# Patient Record
Sex: FEMALE | Race: WHITE | NOT HISPANIC OR LATINO | Employment: OTHER | URBAN - METROPOLITAN AREA
[De-identification: names, ages, dates, MRNs, and addresses within clinical notes are randomized per-mention and may not be internally consistent; named-entity substitution may affect disease eponyms.]

---

## 2019-11-22 ENCOUNTER — OFFICE VISIT (OUTPATIENT)
Dept: GASTROENTEROLOGY | Facility: CLINIC | Age: 69
End: 2019-11-22
Payer: MEDICARE

## 2019-11-22 VITALS
WEIGHT: 231.2 LBS | BODY MASS INDEX: 42.55 KG/M2 | HEART RATE: 62 BPM | HEIGHT: 62 IN | DIASTOLIC BLOOD PRESSURE: 72 MMHG | SYSTOLIC BLOOD PRESSURE: 140 MMHG

## 2019-11-22 DIAGNOSIS — R14.0 ABDOMINAL BLOATING: Primary | ICD-10-CM

## 2019-11-22 DIAGNOSIS — K21.9 GASTROESOPHAGEAL REFLUX DISEASE WITHOUT ESOPHAGITIS: ICD-10-CM

## 2019-11-22 DIAGNOSIS — K59.00 CONSTIPATION, UNSPECIFIED CONSTIPATION TYPE: ICD-10-CM

## 2019-11-22 PROCEDURE — 99214 OFFICE O/P EST MOD 30 MIN: CPT | Performed by: PHYSICIAN ASSISTANT

## 2019-11-22 RX ORDER — DIPHENOXYLATE HYDROCHLORIDE AND ATROPINE SULFATE 2.5; .025 MG/1; MG/1
1 TABLET ORAL DAILY
COMMUNITY

## 2019-11-22 RX ORDER — CYANOCOBALAMIN 1000 UG/ML
INJECTION INTRAMUSCULAR; SUBCUTANEOUS
Refills: 5 | COMMUNITY
Start: 2019-11-05

## 2019-11-22 RX ORDER — NICOTINE POLACRILEX 2 MG
GUM BUCCAL
COMMUNITY
End: 2022-05-04

## 2019-11-22 RX ORDER — PANTOPRAZOLE SODIUM 40 MG/1
TABLET, DELAYED RELEASE ORAL
COMMUNITY
Start: 2018-05-01 | End: 2019-12-04 | Stop reason: SDUPTHER

## 2019-11-22 RX ORDER — NICOTINE POLACRILEX 2 MG
5000 GUM BUCCAL DAILY
COMMUNITY

## 2019-11-22 RX ORDER — FLUTICASONE PROPIONATE 50 MCG
SPRAY, SUSPENSION (ML) NASAL
Refills: 3 | COMMUNITY
Start: 2019-11-08

## 2019-11-22 RX ORDER — ASCORBIC ACID 250 MG
1000 TABLET ORAL 2 TIMES DAILY
COMMUNITY

## 2019-11-22 RX ORDER — FUROSEMIDE 20 MG/1
20 TABLET ORAL DAILY
Refills: 1 | COMMUNITY
Start: 2019-08-20

## 2019-11-22 RX ORDER — POTASSIUM CHLORIDE 750 MG/1
10 TABLET, EXTENDED RELEASE ORAL 2 TIMES DAILY
Refills: 0 | COMMUNITY
Start: 2019-11-08

## 2019-11-22 NOTE — PROGRESS NOTES
[facility] - Outpatient Follow-up Note  Marika Cardenas 81 y o  female MRN: [id number]  Encounter: [id number]          ASSESSMENT AND PLAN:      1  Abdominal bloating  2  Gastroesophageal reflux disease without esophagitis  3  Constipation, unspecified constipation type  Will increase PPI to BID dosing  Will give Rifaxamin trial    Will continue fiber and probiotics  Follow up in 3 weeks  ______________________________________________________________________    SUBJECTIVE:    45-year-old female who is here with abdominal bloating, constipation, gas, and nausea  Patient reports that she has had ongoing issues related to her stomach for some time now but worsened this past summer  Patient reports chronic constipation where she has to take milk of magnesia every 2nd or 3rd day to have a bowel movement  Patient denies any melena or rectal bleeding  Patient's last colonoscopy was in 2014 this was a normal examination  Patient does have a history of a gastric bypass  Patient did have an upper endoscopy in 2018 this was a normal examination  Patient reports that the bloating that she has become so severe after eating that she does not want to eat  She does take pantoprazole every day daily  She denies any hematemesis or dysphagia  REVIEW OF SYSTEMS IS OTHERWISE NEGATIVE        Historical Information   Past Medical History:   Diagnosis Date    [facility] )     Diverticulosis     Endometrial cancer ([facility] )      Past Surgical History:   Procedure Laterality Date    COLONOSCOPY       Social History   Social History     Substance and Sexual Activity   Alcohol Use Not Currently     Social History     Substance and Sexual Activity   Drug Use Never     Social History     Tobacco Use   Smoking Status Never Smoker   Smokeless Tobacco Never Used     Family History   Problem Relation Age of Onset    Cancer Mother     Heart disease Mother     Breast cancer additional onset Mother     Kidney disease Father     Hypertension Father        Meds/Allergies       Current Outpatient Medications:     ascorbic acid (VITAMIN C) 250 MG tablet    aspirin 81 MG tablet    Biotin 1 MG CAPS    Biotin 1 MG CAPS    Calcium Carbonate-Vitamin D3 600-400 MG-UNIT TABS    Cholecalciferol 50 MCG (2000 UT) CAPS    cyanocobalamin 1,000 mcg/mL    fluticasone (FLONASE) 50 mcg/act nasal spray    furosemide (LASIX) 20 mg tablet    KLOR-CON M10 10 MEQ tablet    multivitamin (THERAGRAN) TABS    pantoprazole (PROTONIX) 40 mg tablet    Probiotic Product (PROBIOTIC-10 PO)    rifaximin (XIFAXAN) 550 mg tablet    No Known Allergies        Objective     Blood pressure 140/72, pulse 62, height 5' 2" (1 575 m), weight 105 kg (231 lb 3 2 oz)  Body mass index is 42 29 kg/m²  PHYSICAL EXAM:      General Appearance:   Alert, cooperative, no distress   HEENT:   Normocephalic, atraumatic, anicteric      Neck:  Supple, symmetrical, trachea midline   Lungs:   Clear to auscultation bilaterally; no rales, rhonchi or wheezing; respirations unlabored    Heart[de-identified]   Regular rate and rhythm; no murmur, rub, or gallop  Abdomen:   Soft, non-tender, non-distended; normal bowel sounds; no masses, no organomegaly    Genitalia:   Deferred    Rectal:   Deferred    Extremities:  No cyanosis, clubbing or edema    Pulses:  2+ and symmetric    Skin:  No jaundice, rashes, or lesions    Lymph nodes:  No palpable cervical lymphadenopathy        Lab Results:   No visits with results within 1 Day(s) from this visit  Latest known visit with results is:   No results found for any previous visit  Radiology Results:   No results found

## 2019-11-22 NOTE — LETTER
November 22, 2019     Johnny Lancaster PA-C  [address] [facility]  [address] [facility]  [address] [address]    Patient: Heri Ashton   YOB: 1950   Date of Visit: 11/22/2019       Dear Dr Negra Omalley: Thank you for referring Heri Ashton to me for evaluation  Below are my notes for this consultation  If you have questions, please do not hesitate to call me  I look forward to following your patient along with you  Sincerely,        Emory Simmons PA-C        CC: No Recipients  Sam Gracia  11/22/2019 12:19 PM  Sign at close encounter  [facility] - Outpatient Follow-up Note  Heri Ashton 81 y o  female MRN: [id number]  Encounter: [id number]          ASSESSMENT AND PLAN:      1  Abdominal bloating  2  Gastroesophageal reflux disease without esophagitis  3  Constipation, unspecified constipation type  Will increase PPI to BID dosing  Will give Rifaxamin trial    Will continue fiber and probiotics  Follow up in 3 weeks  ______________________________________________________________________    SUBJECTIVE:    29-year-old female who is here with abdominal bloating, constipation, gas, and nausea  Patient reports that she has had ongoing issues related to her stomach for some time now but worsened this past summer  Patient reports chronic constipation where she has to take milk of magnesia every 2nd or 3rd day to have a bowel movement  Patient denies any melena or rectal bleeding  Patient's last colonoscopy was in 2014 this was a normal examination  Patient does have a history of a gastric bypass  Patient did have an upper endoscopy in 2018 this was a normal examination  Patient reports that the bloating that she has become so severe after eating that she does not want to eat  She does take pantoprazole every day daily  She denies any hematemesis or dysphagia  REVIEW OF SYSTEMS IS OTHERWISE NEGATIVE        Historical Information   Past Medical History:   Diagnosis Date    Cancer [facility])     Diverticulosis     [facility] )      Past Surgical History:   Procedure Laterality Date    COLONOSCOPY       Social History   Social History     Substance and Sexual Activity   Alcohol Use Not Currently     Social History     Substance and Sexual Activity   Drug Use Never     Social History     Tobacco Use   Smoking Status Never Smoker   Smokeless Tobacco Never Used     Family History   Problem Relation Age of Onset    Cancer Mother     Heart disease Mother     Breast cancer additional onset Mother     Kidney disease Father     Hypertension Father        Meds/Allergies       Current Outpatient Medications:     ascorbic acid (VITAMIN C) 250 MG tablet    aspirin 81 MG tablet    Biotin 1 MG CAPS    Biotin 1 MG CAPS    Calcium Carbonate-Vitamin D3 600-400 MG-UNIT TABS    Cholecalciferol 50 MCG (2000 UT) CAPS    cyanocobalamin 1,000 mcg/mL    fluticasone (FLONASE) 50 mcg/act nasal spray    furosemide (LASIX) 20 mg tablet    KLOR-CON M10 10 MEQ tablet    multivitamin (THERAGRAN) TABS    pantoprazole (PROTONIX) 40 mg tablet    Probiotic Product (PROBIOTIC-10 PO)    rifaximin (XIFAXAN) 550 mg tablet    No Known Allergies        Objective     Blood pressure 140/72, pulse 62, height 5' 2" (1 575 m), weight 105 kg (231 lb 3 2 oz)  Body mass index is 42 29 kg/m²  PHYSICAL EXAM:      General Appearance:   Alert, cooperative, no distress   HEENT:   Normocephalic, atraumatic, anicteric      Neck:  Supple, symmetrical, trachea midline   Lungs:   Clear to auscultation bilaterally; no rales, rhonchi or wheezing; respirations unlabored    Heart[de-identified]   Regular rate and rhythm; no murmur, rub, or gallop     Abdomen:   Soft, non-tender, non-distended; normal bowel sounds; no masses, no organomegaly    Genitalia:   Deferred    Rectal:   Deferred    Extremities:  No cyanosis, clubbing or edema    Pulses:  2+ and symmetric    Skin:  No jaundice, rashes, or lesions    Lymph nodes:  No palpable cervical lymphadenopathy        Lab Results:   No visits with results within 1 Day(s) from this visit  Latest known visit with results is:   No results found for any previous visit  Radiology Results:   No results found

## 2019-11-22 NOTE — PATIENT INSTRUCTIONS
Gas and Bloating   WHAT YOU NEED TO KNOW:   Gas forms inside your body when you eat certain foods or swallow too much air  Bloating is the tight, full feeling you get from too much gas  DISCHARGE INSTRUCTIONS:   Medicines:   · Gas relief medicines: These may help decrease gas pain and bloating  These can be bought without a doctor's order  · Take your medicine as directed  Contact your healthcare provider if you think your medicine is not helping or if you have side effects  Tell him or her if you are allergic to any medicine  Keep a list of the medicines, vitamins, and herbs you take  Include the amounts, and when and why you take them  Bring the list or the pill bottles to follow-up visits  Carry your medicine list with you in case of an emergency  How to manage gas and bloating:   · Keep a log:  Write down what you eat and drink and how often you pass gas each day  · Eat and drink slowly:  Choose foods that do not cause gas, such as meat, poultry, fish, and eggs  Avoid high-fat foods and vegetables or starches that can cause gas  Do not drink carbonated drinks  Add foods back into your diet one at a time after about a week  If the food causes symptoms, avoid it  · Exercise:  Exercise can help relieve gas  · Do not smoke or chew gum: This can cause you to swallow air  · Make sure your dentures fit properly:  Have your dentures fixed if they are loose  Loose dentures can cause you to swallow too much air  Follow up with your healthcare provider as directed:  Write down your questions so you remember to ask them during your visits  Contact your healthcare provider if:   · You have a fever  · You vomit or have diarrhea  · You lose weight without trying  · You have questions or concerns about your condition or care  Return to the emergency department if:   · You have severe abdominal pain  · You have blood in your bowel movement    © 2017 [address] [employer] Information is for End User's use only and may not be sold, redistributed or otherwise used for commercial purposes  All illustrations and images included in CareNotes® are the copyrighted property of A D A M [employer] Inc  or Jason Sanchez  The above information is an  only  It is not intended as medical advice for individual conditions or treatments  Talk to your doctor, nurse or pharmacist before following any medical regimen to see if it is safe and effective for you

## 2019-11-26 ENCOUNTER — TELEPHONE (OUTPATIENT)
Dept: GASTROENTEROLOGY | Facility: CLINIC | Age: 69
End: 2019-11-26

## 2019-11-26 NOTE — TELEPHONE ENCOUNTER
Roge Winkler pt - Pt states that she has not received a call from the pharmacy that Roge Winkler said would call her, also wants to know if she can get the medication locally  Please call [phone]   Ty

## 2019-11-26 NOTE — TELEPHONE ENCOUNTER
Called [facility] and spoke to (Rep) she confirmed the copay is $622 24  Aaron Rodriges going to contact pt and advised her of the copay and offer her patient assistance

## 2019-12-03 ENCOUNTER — TELEPHONE (OUTPATIENT)
Dept: GASTROENTEROLOGY | Facility: CLINIC | Age: 69
End: 2019-12-03

## 2019-12-03 NOTE — TELEPHONE ENCOUNTER
Jason Andrade pt - Pt had some questions regarding the medication the Khloe prescribed for her  Pt states she received a letter from the pharmacy that she has a question about  Please call [phone]   Ty

## 2019-12-04 DIAGNOSIS — K21.9 GASTROESOPHAGEAL REFLUX DISEASE WITHOUT ESOPHAGITIS: Primary | ICD-10-CM

## 2019-12-04 RX ORDER — PANTOPRAZOLE SODIUM 40 MG/1
40 TABLET, DELAYED RELEASE ORAL 2 TIMES DAILY
Qty: 180 TABLET | Refills: 3 | Status: SHIPPED | OUTPATIENT
Start: 2019-12-04 | End: 2020-12-21

## 2019-12-13 DIAGNOSIS — K58.0 IRRITABLE BOWEL SYNDROME WITH DIARRHEA: Primary | ICD-10-CM

## 2019-12-13 NOTE — TELEPHONE ENCOUNTER
Rx for Xifaxan sent for approval    Rx sent to print to send along with Pt  Assistance Program application

## 2019-12-16 ENCOUNTER — OFFICE VISIT (OUTPATIENT)
Dept: GASTROENTEROLOGY | Facility: CLINIC | Age: 69
End: 2019-12-16
Payer: MEDICARE

## 2019-12-16 VITALS
SYSTOLIC BLOOD PRESSURE: 142 MMHG | DIASTOLIC BLOOD PRESSURE: 84 MMHG | HEART RATE: 67 BPM | BODY MASS INDEX: 42.76 KG/M2 | HEIGHT: 62 IN | WEIGHT: 232.4 LBS

## 2019-12-16 DIAGNOSIS — R14.0 ABDOMINAL BLOATING: Primary | ICD-10-CM

## 2019-12-16 DIAGNOSIS — K58.0 IRRITABLE BOWEL SYNDROME WITH DIARRHEA: ICD-10-CM

## 2019-12-16 PROCEDURE — 99213 OFFICE O/P EST LOW 20 MIN: CPT | Performed by: PHYSICIAN ASSISTANT

## 2019-12-16 NOTE — PROGRESS NOTES
[facility] - Outpatient Follow-up Note  Tata Frost 81 y o  female MRN: [id number]  Encounter: [id number]          ASSESSMENT AND PLAN:      1  Irritable bowel syndrome with diarrhea  2  Abdominal bloating  Will continue fiber and probiotics  Will give Xifaxan trial   Samples provided  Will continue PPI b i d  Recommend 6 small meals a day  No plans for any endoscopic evaluation  ______________________________________________________________________    SUBJECTIVE:    29-year-old female who is here for follow-up of irritable bowel syndrome as well as abdominal bloating  Patient has not yet received her Xifaxan course due to insurance issues  She is reporting improvement since restarting her pantoprazole 40 mg twice a day but she is still having issues with abdominal gas and bloating  She denies any constipation or diarrhea issues  She denies any severe abdominal pains  She does report abdominal discomfort  REVIEW OF SYSTEMS IS OTHERWISE NEGATIVE        Historical Information   Past Medical History:   Diagnosis Date    Cancer ([facility] )     Diverticulosis     Endometrial cancer ([facility] )      Past Surgical History:   Procedure Laterality Date    COLONOSCOPY       Social History   Social History     Substance and Sexual Activity   Alcohol Use Not Currently     Social History     Substance and Sexual Activity   Drug Use Never     Social History     Tobacco Use   Smoking Status Never Smoker   Smokeless Tobacco Never Used     Family History   Problem Relation Age of Onset    Cancer Mother     Heart disease Mother     Breast cancer additional onset Mother     Kidney disease Father     Hypertension Father        Meds/Allergies       Current Outpatient Medications:     ascorbic acid (VITAMIN C) 250 MG tablet    aspirin 81 MG tablet    Biotin 1 MG CAPS    Biotin 1 MG CAPS    Calcium Carbonate-Vitamin D3 600-400 MG-UNIT TABS    Cholecalciferol 50 MCG (2000 UT) CAPS   cyanocobalamin 1,000 mcg/mL    fluticasone (FLONASE) 50 mcg/act nasal spray    furosemide (LASIX) 20 mg tablet    KLOR-CON M10 10 MEQ tablet    multivitamin (THERAGRAN) TABS    pantoprazole (PROTONIX) 40 mg tablet    Probiotic Product (PROBIOTIC-10 PO)    rifaximin (XIFAXAN) 550 mg tablet    No Known Allergies        Objective     Blood pressure 142/84, pulse 67, height 5' 2" (1 575 m), weight 105 kg (232 lb 6 4 oz)  Body mass index is 42 51 kg/m²  PHYSICAL EXAM:      General Appearance:   Alert, cooperative, no distress   HEENT:   Normocephalic, atraumatic, anicteric      Neck:  Supple, symmetrical, trachea midline   Lungs:   Clear to auscultation bilaterally; no rales, rhonchi or wheezing; respirations unlabored    Heart[de-identified]   Regular rate and rhythm; no murmur, rub, or gallop  Abdomen:   Soft, non-tender, non-distended; normal bowel sounds; no masses, no organomegaly    Genitalia:   Deferred    Rectal:   Deferred    Extremities:  No cyanosis, clubbing or edema    Pulses:  2+ and symmetric    Skin:  No jaundice, rashes, or lesions    Lymph nodes:  No palpable cervical lymphadenopathy        Lab Results:   No visits with results within 1 Day(s) from this visit  Latest known visit with results is:   No results found for any previous visit  Radiology Results:   No results found

## 2019-12-16 NOTE — TELEPHONE ENCOUNTER
[facility] Patient Assistance for status update, they confirmed receipt of the application but it is still under review and will not have a determination until 2/2020  Khloe provided pt with samples of Xifaxan  No further action is needed at this time with [facility] Patient assistance

## 2020-01-07 ENCOUNTER — TELEPHONE (OUTPATIENT)
Dept: GASTROENTEROLOGY | Facility: CLINIC | Age: 70
End: 2020-01-07

## 2020-01-07 NOTE — TELEPHONE ENCOUNTER
rcvd letter from [facility] patient assistance     Stated pt is not eligible for assistance

## 2020-02-19 ENCOUNTER — TELEPHONE (OUTPATIENT)
Dept: GASTROENTEROLOGY | Facility: CLINIC | Age: 70
End: 2020-02-19

## 2020-02-19 NOTE — TELEPHONE ENCOUNTER
Destiney Wylie - patient called lmom she found a packet of antibiotics with 3  Pills  Should patient take them   Please call Felicita Willson at [phone]

## 2020-02-19 NOTE — TELEPHONE ENCOUNTER
Called pt, she found the xifaxan and asked if she should still take     I asked Michael Bennett and was told ok for pt to take    Pt also said has heart burn and made an appt to see Michael Bennett on the 27th

## 2020-02-21 ENCOUNTER — TELEPHONE (OUTPATIENT)
Dept: GASTROENTEROLOGY | Facility: CLINIC | Age: 70
End: 2020-02-21

## 2020-02-21 NOTE — TELEPHONE ENCOUNTER
Vaibhav Rodriguez - Patient called lmom - patient was very sick  Wednesday night, 02/19/20   Would like to discuss how she felt with the nurse, Please call Kerry Pérez at [phone] ty

## 2020-02-21 NOTE — TELEPHONE ENCOUNTER
Triage Telephone Intake  Provider: Treasure Humphreys  Chief complaint:loose stools 2/19 & into early am 2/20/20 with cramps in upper mid ABD  Diarrhea:2/19/20 & early am 2/20/20  Blood in stool:denies  LBM:today   Nausea:yes x3 days  Vomiting:denies  Fever:denies  Last office visit:12/16/19  Patient has follow up appointment scheduled: 2/27/20  Patient reported 2/19-2/20/20 early am: frequent loose stools (every hour) with x1 stool incontinence; GERD; weakness in both lower extremities & tiredness  Yesterday pm & today: patient reports feeling better  Denies frequent loose stools, cramping, GERD, weakness in both lower extremities  Reports at times nausea episodes  Recommendations given:(1) stay well hydrated; (2) call office any symptoms return; (3) go to ED if increased ABD pain, weakness in lower extremities, frequent loose stools, keep appointment with Treasure Humphreys on 2/27/20

## 2020-02-22 ENCOUNTER — HOSPITAL ENCOUNTER (EMERGENCY)
Facility: HOSPITAL | Age: 70
Discharge: HOME | End: 2020-02-22
Attending: EMERGENCY MEDICINE | Admitting: EMERGENCY MEDICINE
Payer: MEDICARE

## 2020-02-22 ENCOUNTER — APPOINTMENT (EMERGENCY)
Dept: CT IMAGING | Facility: HOSPITAL | Age: 70
End: 2020-02-22
Payer: MEDICARE

## 2020-02-22 VITALS
HEART RATE: 81 BPM | TEMPERATURE: 97.8 F | SYSTOLIC BLOOD PRESSURE: 126 MMHG | RESPIRATION RATE: 17 BRPM | OXYGEN SATURATION: 98 % | DIASTOLIC BLOOD PRESSURE: 74 MMHG

## 2020-02-22 DIAGNOSIS — N39.0 UTI (URINARY TRACT INFECTION): ICD-10-CM

## 2020-02-22 DIAGNOSIS — R19.7 DIARRHEA: Primary | ICD-10-CM

## 2020-02-22 LAB
ALBUMIN SERPL BCP-MCNC: 3.3 G/DL (ref 3.5–5)
ALP SERPL-CCNC: 87 U/L (ref 46–116)
ALT SERPL W P-5'-P-CCNC: 25 U/L (ref 12–78)
ANION GAP SERPL CALCULATED.3IONS-SCNC: 11 MMOL/L (ref 4–13)
AST SERPL W P-5'-P-CCNC: 21 U/L (ref 5–45)
BACTERIA UR QL AUTO: ABNORMAL /HPF
BASOPHILS # BLD AUTO: 0.05 THOUSANDS/ΜL (ref 0–0.1)
BASOPHILS NFR BLD AUTO: 1 % (ref 0–1)
BILIRUB SERPL-MCNC: 0.8 MG/DL (ref 0.2–1)
BILIRUB UR QL STRIP: NEGATIVE
BUN SERPL-MCNC: 20 MG/DL (ref 5–25)
CALCIUM SERPL-MCNC: 8.8 MG/DL (ref 8.3–10.1)
CHLORIDE SERPL-SCNC: 108 MMOL/L (ref 100–108)
CLARITY UR: CLEAR
CO2 SERPL-SCNC: 23 MMOL/L (ref 21–32)
COLOR UR: YELLOW
CREAT SERPL-MCNC: 0.76 MG/DL (ref 0.6–1.3)
EOSINOPHIL # BLD AUTO: 0.36 THOUSAND/ΜL (ref 0–0.61)
EOSINOPHIL NFR BLD AUTO: 8 % (ref 0–6)
ERYTHROCYTE [DISTWIDTH] IN BLOOD BY AUTOMATED COUNT: 14.6 % (ref 11.6–15.1)
GFR SERPL CREATININE-BSD FRML MDRD: 81 ML/MIN/1.73SQ M
GLUCOSE SERPL-MCNC: 90 MG/DL (ref 65–140)
GLUCOSE UR STRIP-MCNC: NEGATIVE MG/DL
HCT VFR BLD AUTO: 41.8 % (ref 34.8–46.1)
HGB BLD-MCNC: 13.3 G/DL (ref 11.5–15.4)
HGB UR QL STRIP.AUTO: NEGATIVE
HYALINE CASTS #/AREA URNS LPF: ABNORMAL /LPF
IMM GRANULOCYTES # BLD AUTO: 0.01 THOUSAND/UL (ref 0–0.2)
IMM GRANULOCYTES NFR BLD AUTO: 0 % (ref 0–2)
KETONES UR STRIP-MCNC: NEGATIVE MG/DL
LEUKOCYTE ESTERASE UR QL STRIP: ABNORMAL
LIPASE SERPL-CCNC: 114 U/L (ref 73–393)
LYMPHOCYTES # BLD AUTO: 1.12 THOUSANDS/ΜL (ref 0.6–4.47)
LYMPHOCYTES NFR BLD AUTO: 24 % (ref 14–44)
MCH RBC QN AUTO: 30.2 PG (ref 26.8–34.3)
MCHC RBC AUTO-ENTMCNC: 31.8 G/DL (ref 31.4–37.4)
MCV RBC AUTO: 95 FL (ref 82–98)
MONOCYTES # BLD AUTO: 0.65 THOUSAND/ΜL (ref 0.17–1.22)
MONOCYTES NFR BLD AUTO: 14 % (ref 4–12)
MUCOUS THREADS UR QL AUTO: ABNORMAL
NEUTROPHILS # BLD AUTO: 2.58 THOUSANDS/ΜL (ref 1.85–7.62)
NEUTS SEG NFR BLD AUTO: 53 % (ref 43–75)
NITRITE UR QL STRIP: POSITIVE
NON-SQ EPI CELLS URNS QL MICRO: ABNORMAL /HPF
NRBC BLD AUTO-RTO: 0 /100 WBCS
PH UR STRIP.AUTO: 5.5 [PH]
PLATELET # BLD AUTO: 151 THOUSANDS/UL (ref 149–390)
PMV BLD AUTO: 10.9 FL (ref 8.9–12.7)
POTASSIUM SERPL-SCNC: 3.7 MMOL/L (ref 3.5–5.3)
PROT SERPL-MCNC: 7.3 G/DL (ref 6.4–8.2)
PROT UR STRIP-MCNC: NEGATIVE MG/DL
RBC # BLD AUTO: 4.4 MILLION/UL (ref 3.81–5.12)
RBC #/AREA URNS AUTO: ABNORMAL /HPF
SODIUM SERPL-SCNC: 142 MMOL/L (ref 136–145)
SP GR UR STRIP.AUTO: <=1.005 (ref 1–1.03)
UROBILINOGEN UR QL STRIP.AUTO: 0.2 E.U./DL
WBC # BLD AUTO: 4.77 THOUSAND/UL (ref 4.31–10.16)
WBC #/AREA URNS AUTO: ABNORMAL /HPF

## 2020-02-22 PROCEDURE — 96361 HYDRATE IV INFUSION ADD-ON: CPT

## 2020-02-22 PROCEDURE — 87077 CULTURE AEROBIC IDENTIFY: CPT | Performed by: EMERGENCY MEDICINE

## 2020-02-22 PROCEDURE — 85025 COMPLETE CBC W/AUTO DIFF WBC: CPT | Performed by: EMERGENCY MEDICINE

## 2020-02-22 PROCEDURE — 87086 URINE CULTURE/COLONY COUNT: CPT | Performed by: EMERGENCY MEDICINE

## 2020-02-22 PROCEDURE — 99285 EMERGENCY DEPT VISIT HI MDM: CPT

## 2020-02-22 PROCEDURE — 36415 COLL VENOUS BLD VENIPUNCTURE: CPT | Performed by: EMERGENCY MEDICINE

## 2020-02-22 PROCEDURE — 99284 EMERGENCY DEPT VISIT MOD MDM: CPT | Performed by: EMERGENCY MEDICINE

## 2020-02-22 PROCEDURE — 81001 URINALYSIS AUTO W/SCOPE: CPT | Performed by: EMERGENCY MEDICINE

## 2020-02-22 PROCEDURE — 96374 THER/PROPH/DIAG INJ IV PUSH: CPT

## 2020-02-22 PROCEDURE — 74177 CT ABD & PELVIS W/CONTRAST: CPT

## 2020-02-22 PROCEDURE — 87186 SC STD MICRODIL/AGAR DIL: CPT | Performed by: EMERGENCY MEDICINE

## 2020-02-22 PROCEDURE — 83690 ASSAY OF LIPASE: CPT | Performed by: EMERGENCY MEDICINE

## 2020-02-22 PROCEDURE — 80053 COMPREHEN METABOLIC PANEL: CPT | Performed by: EMERGENCY MEDICINE

## 2020-02-22 RX ORDER — DICYCLOMINE HCL 20 MG
20 TABLET ORAL ONCE
Status: COMPLETED | OUTPATIENT
Start: 2020-02-22 | End: 2020-02-22

## 2020-02-22 RX ORDER — ONDANSETRON 2 MG/ML
4 INJECTION INTRAMUSCULAR; INTRAVENOUS ONCE
Status: COMPLETED | OUTPATIENT
Start: 2020-02-22 | End: 2020-02-22

## 2020-02-22 RX ORDER — CEPHALEXIN 250 MG/1
500 CAPSULE ORAL 4 TIMES DAILY
Qty: 56 CAPSULE | Refills: 0 | Status: SHIPPED | OUTPATIENT
Start: 2020-02-22 | End: 2020-02-29

## 2020-02-22 RX ORDER — DICYCLOMINE HCL 20 MG
20 TABLET ORAL 2 TIMES DAILY
Qty: 20 TABLET | Refills: 0 | Status: SHIPPED | OUTPATIENT
Start: 2020-02-22 | End: 2020-03-09

## 2020-02-22 RX ORDER — SODIUM CHLORIDE 9 MG/ML
1000 INJECTION, SOLUTION INTRAVENOUS ONCE
Status: COMPLETED | OUTPATIENT
Start: 2020-02-22 | End: 2020-02-22

## 2020-02-22 RX ADMIN — DICYCLOMINE HYDROCHLORIDE 20 MG: 20 TABLET ORAL at 13:02

## 2020-02-22 RX ADMIN — IOHEXOL 100 ML: 350 INJECTION, SOLUTION INTRAVENOUS at 13:44

## 2020-02-22 RX ADMIN — SODIUM CHLORIDE 1000 ML/HR: 0.9 INJECTION, SOLUTION INTRAVENOUS at 13:02

## 2020-02-22 RX ADMIN — ONDANSETRON 4 MG: 2 INJECTION INTRAMUSCULAR; INTRAVENOUS at 13:02

## 2020-02-22 NOTE — ED NOTES
Patient transported to [address] [place] [address], [address] [place] [address]  02/22/20 [id number]

## 2020-02-22 NOTE — DISCHARGE INSTRUCTIONS
Take medication as prescribed for urinary tract infection in addition to abdominal cramping bloating  Follow-up with her GI doctor as scheduled  Also follow-up with her primary care doctor for emergency department follow-up appointment

## 2020-02-22 NOTE — ED PROVIDER NOTES
History  Chief Complaint   Patient presents with    Weakness - Generalized     pt states to have feel "off and weak" for the past 3 days     Abdominal Pain     pt states to have abdominal pain, mid upper abdominal quadrant for the past 3 days  pt c/o nausea, diarrhea      HPI  81-year-old female past medical history of IBS followed by GI presents with abdominal pain and diarrhea for the past few weeks  States she has been feeling more weak for the last 3 days  States she feels very bloated  She has an appointment with her GI doctor coming up this coming Thursday  She has mild nausea  Denies chest pain, shortness of breath, vaginal discharge, dysuria or frequency  Prior to Admission Medications   Prescriptions Last Dose Informant Patient Reported? Taking? Biotin 1 MG CAPS  Self Yes No   Sig: biotin   Biotin 1 MG CAPS  Self Yes No   Sig: Take 5,000 mcg by mouth daily   Calcium Carbonate-Vitamin D3 600-400 MG-UNIT TABS  Self Yes No   Sig: Take by mouth daily   Cholecalciferol 50 MCG (2000 UT) CAPS  Self Yes No   Sig: Take 1 capsule by mouth daily   KLOR-CON M10 10 MEQ tablet  Self Yes No   Sig: Take 10 mEq by mouth 2 (two) times a day   Probiotic Product (PROBIOTIC-10 PO)  Self Yes No   Sig: Probiotic   ascorbic acid (VITAMIN C) 250 MG tablet  Self Yes No   Sig: Take 1,000 mg by mouth 2 (two) times a day   aspirin 81 MG tablet  Self Yes No   Sig: Take 81 mg by mouth daily   cyanocobalamin 1,000 mcg/mL  Self Yes No   Sig: INJECT 1 ML INJECT INTO THE MUSCLE EVERY 30 (THIRTY) DAYS     fluticasone (FLONASE) 50 mcg/act nasal spray  Self Yes No   Sig: SPRAY 2 SPRAYS INTO EACH NOSTRIL EVERY DAY   furosemide (LASIX) 20 mg tablet  Self Yes No   Sig: Take 20 mg by mouth daily   multivitamin (THERAGRAN) TABS  Self Yes No   Sig: Take 1 tablet by mouth daily   pantoprazole (PROTONIX) 40 mg tablet  Self No No   Sig: Take 1 tablet (40 mg total) by mouth 2 (two) times a day      Facility-Administered Medications: None Past Medical History:   Diagnosis Date    Cancer [facility])     [facility] [address] )     Diverticulosis     [facility]     Hypoglycemia        Past Surgical History:   Procedure Laterality Date    CHOLECYSTECTOMY      COLONOSCOPY      GASTRIC BYPASS         Family History   Problem Relation Age of Onset    Cancer Mother     Heart disease Mother     Breast cancer additional onset Mother     Kidney disease Father     Hypertension Father      I have reviewed and agree with the history as documented  Social History     Tobacco Use    Smoking status: Never Smoker    Smokeless tobacco: Never Used   Substance Use Topics    Alcohol use: Not Currently    Drug use: Never       Review of Systems   Constitutional: Negative for chills and fever  HENT: Negative for dental problem and ear pain  Eyes: Negative for pain and redness  Respiratory: Negative for cough and shortness of breath  Cardiovascular: Negative for chest pain and palpitations  Gastrointestinal: Positive for abdominal pain, diarrhea and nausea  Endocrine: Negative for polydipsia and polyphagia  Genitourinary: Negative for dysuria and frequency  Musculoskeletal: Negative for arthralgias and joint swelling  Skin: Negative for color change and rash  Neurological: Positive for weakness  Negative for dizziness and headaches  Psychiatric/Behavioral: Negative for behavioral problems and confusion  All other systems reviewed and are negative  Physical Exam  Physical Exam   Constitutional: She is oriented to person, place, and time  She appears well-developed and well-nourished  No distress  HENT:   Head: Atraumatic  Right Ear: External ear normal    Left Ear: External ear normal    Nose: Nose normal    Eyes: Pupils are equal, round, and reactive to light  Conjunctivae and EOM are normal    Neck: Normal range of motion  Neck supple  No JVD present     Cardiovascular: Normal rate, regular rhythm and normal heart sounds  No murmur heard  Pulmonary/Chest: Effort normal and breath sounds normal  No respiratory distress  She has no wheezes  Abdominal: Soft  Bowel sounds are normal  She exhibits no distension  There is generalized tenderness  Musculoskeletal: Normal range of motion  She exhibits no edema  Neurological: She is alert and oriented to person, place, and time  No cranial nerve deficit  Skin: Skin is warm and dry  Capillary refill takes less than 2 seconds  She is not diaphoretic  Psychiatric: She has a normal mood and affect  Her behavior is normal    Nursing note and vitals reviewed        Vital Signs  ED Triage Vitals   Temperature Pulse Respirations Blood Pressure SpO2   02/22/20 1153 02/22/20 1153 02/22/20 1153 02/22/20 1153 02/22/20 1153   97 8 °F (36 6 °C) 89 18 127/85 99 %      Temp Source Heart Rate Source Patient Position - Orthostatic VS BP Location FiO2 (%)   02/22/20 1153 02/22/20 1153 02/22/20 1153 02/22/20 1153 --   Oral Monitor Sitting Left arm       Pain Score       02/22/20 1334       3           Vitals:    02/22/20 1153 02/22/20 1240 02/22/20 1334   BP: 127/85 124/79 126/74   Pulse: 89 85 81   Patient Position - Orthostatic VS: Sitting           Visual Acuity      ED Medications  Medications   iohexol (OMNIPAQUE) 350 MG/ML injection (MULTI-DOSE) 100 mL (has no administration in time range)   ondansetron (ZOFRAN) injection 4 mg (4 mg Intravenous Given 2/22/20 1302)   sodium chloride 0 9 % infusion (0 mL/hr Intravenous Stopped 2/22/20 1405)   dicyclomine (BENTYL) tablet 20 mg (20 mg Oral Given 2/22/20 1302)   iohexol (OMNIPAQUE) 350 MG/ML injection (MULTI-DOSE) 100 mL (100 mL Intravenous Given 2/22/20 1344)       Diagnostic Studies  Results Reviewed     Procedure Component Value Units Date/Time    CBC and differential [[id number]]  (Abnormal) Collected:  02/22/20 1302    Lab Status:  Final result Specimen:  Blood from Arm, Right Updated:  02/22/20 1332     WBC 4 77 Thousand/uL RBC 4 40 Million/uL      Hemoglobin 13 3 g/dL      Hematocrit 41 8 %      MCV 95 fL      MCH 30 2 pg      MCHC 31 8 g/dL      RDW 14 6 %      MPV 10 9 fL      Platelets 747 Thousands/uL      nRBC 0 /100 WBCs      Neutrophils Relative 53 %      Immat GRANS % 0 %      Lymphocytes Relative 24 %      Monocytes Relative 14 %      Eosinophils Relative 8 %      Basophils Relative 1 %      Neutrophils Absolute 2 58 Thousands/µL      Immature Grans Absolute 0 01 Thousand/uL      Lymphocytes Absolute 1 12 Thousands/µL      Monocytes Absolute 0 65 Thousand/µL      Eosinophils Absolute 0 36 Thousand/µL      Basophils Absolute 0 05 Thousands/µL     Comprehensive metabolic panel [[id number]]  (Abnormal) Collected:  02/22/20 1302    Lab Status:  Final result Specimen:  Blood from Arm, Right Updated:  02/22/20 1329     Sodium 142 mmol/L      Potassium 3 7 mmol/L      Chloride 108 mmol/L      CO2 23 mmol/L      ANION GAP 11 mmol/L      BUN 20 mg/dL      Creatinine 0 76 mg/dL      Glucose 90 mg/dL      Calcium 8 8 mg/dL      AST 21 U/L      ALT 25 U/L      Alkaline Phosphatase 87 U/L      Total Protein 7 3 g/dL      Albumin 3 3 g/dL      Total Bilirubin 0 80 mg/dL      eGFR 81 ml/min/1 73sq m     Narrative:       [place] guidelines for Chronic Kidney Disease (CKD):     Stage 1 with normal or high GFR (GFR > 90 mL/min/1 73 square meters)    Stage 2 Mild CKD (GFR = 60-89 mL/min/1 73 square meters)    Stage 3A Moderate CKD (GFR = 45-59 mL/min/1 73 square meters)    Stage 3B Moderate CKD (GFR = 30-44 mL/min/1 73 square meters)    Stage 4 Severe CKD (GFR = 15-29 mL/min/1 73 square meters)    Stage 5 End Stage CKD (GFR <15 mL/min/1 73 square meters)  Note: GFR calculation is accurate only with a steady state creatinine    Lipase [[id number]]  (Normal) Collected:  02/22/20 1302    Lab Status:  Final result Specimen:  Blood from Arm, Right Updated:  02/22/20 1329     Lipase 114 u/L     Urine Microscopic [[id number]]  (Abnormal) Collected:  02/22/20 1305    Lab Status:  Final result Specimen:  Urine, Clean Catch Updated:  02/22/20 1322     RBC, UA 0-1 /hpf      WBC, UA 10-20 /hpf      Epithelial Cells Occasional /hpf      Bacteria, UA Moderate /hpf      Hyaline Casts, UA 0-1 /lpf      MUCUS THREADS Occasional    Urine culture [[id number]] Collected:  02/22/20 1305    Lab Status: In process Specimen:  Urine, Clean Catch Updated:  02/22/20 1321    UA w Reflex to Microscopic w Reflex to Culture [[id number]]  (Abnormal) Collected:  02/22/20 1305    Lab Status:  Final result Specimen:  Urine, Clean Catch Updated:  02/22/20 1314     Color, UA Yellow     Clarity, UA Clear     Specific Gravity, UA <=1 005     pH, UA 5 5     Leukocytes, UA Trace     Nitrite, UA Positive     Protein, UA Negative mg/dl      Glucose, UA Negative mg/dl      Ketones, UA Negative mg/dl      Urobilinogen, UA 0 2 E U /dl      Bilirubin, UA Negative     Blood, UA Negative                 CT abdomen pelvis with contrast   Final Result by Anya Luna MD (02/22 1401)      No acute intra-abdominal abnormality  Small hiatal hernia  Colonic diverticulosis  Workstation performed: [id number]                    Procedures  Procedures         ED Course           Identification of Seniors at Risk      Most Recent Value   (ISAR) Identification of Seniors at Risk   Before the illness or injury that brought you to the Emergency, did you need someone to help you on a regular basis? 0 Filed at: 02/22/2020 1155   In the last 24 hours, have you needed more help than usual?  0 Filed at: 02/22/2020 1155   Have you been hospitalized for one or more nights during the past 6 months? 0 Filed at: 02/22/2020 1155   In general, do you see well?  0 Filed at: 02/22/2020 1155   In general, do you have serious problems with your memory? 0 Filed at: 02/22/2020 1155   Do you take more than three different medications every day?   1 Filed at: 02/22/2020 1156 ISAR Score  1 Filed at: 02/22/2020 1155                          MDM  [de-identified] year old female presents with diarrhea and weakness  Labs unremarkable except for UTI which may be causing her symptoms of weakness/malaise  Will treat this with Keflex  Diarrhea and abdominal pain have been ongoing and she is following with GI for this  CT is unremarkable  She feels better after Bentyl, will give prescription for this and she has follow-up appointment next week with GI  Disposition  Final diagnoses:   Diarrhea   UTI (urinary tract infection)     Time reflects when diagnosis was documented in both MDM as applicable and the Disposition within this note     Time User Action Codes Description Comment    2/22/2020  2:20 PM Edward Rebel Add [R19 7] Diarrhea     2/22/2020  2:20 PM Ladene Rebel Add [N39 0] UTI (urinary tract infection)       ED Disposition     ED Disposition Condition Date/Time Comment    Discharge Stable Sat Feb 22, 2020  2:20 PM Rosi Lowery discharge to home/self care              Follow-up Information     Follow up With Specialties Details Why Contact Info Additional Information    Frandy Barrios PA-C Physician Assistant Schedule an appointment as soon as possible for a visit   [address] [place] [phone]  [place] [address] [facility] Gastroenterology Go to  as scheduled [address] [facility] [phone]  [address] [facility], [address] [place] [address] [address], [facility], [place], [address]           Discharge Medication List as of 2/22/2020  2:22 PM      START taking these medications    Details   cephalexin (KEFLEX) 250 mg capsule Take 2 capsules (500 mg total) by mouth 4 (four) times a day for 7 days, Starting Sat 2/22/2020, Until Sat 2/29/2020, Print      dicyclomine (BENTYL) 20 mg tablet Take 1 tablet (20 mg total) by mouth 2 (two) times a day, Starting Sat 2/22/2020, Print         CONTINUE these medications which have NOT CHANGED    Details   ascorbic acid (VITAMIN C) 250 MG tablet Take 1,000 mg by mouth 2 (two) times a day, Historical Med      aspirin 81 MG tablet Take 81 mg by mouth daily, Historical Med      !! Biotin 1 MG CAPS biotin, Historical Med      !! Biotin 1 MG CAPS Take 5,000 mcg by mouth daily, Historical Med      Calcium Carbonate-Vitamin D3 600-400 MG-UNIT TABS Take by mouth daily, Historical Med      Cholecalciferol 50 MCG (2000 UT) CAPS Take 1 capsule by mouth daily, Historical Med      cyanocobalamin 1,000 mcg/mL INJECT 1 ML INJECT INTO THE MUSCLE EVERY 30 (THIRTY) DAYS , Historical Med      fluticasone (FLONASE) 50 mcg/act nasal spray SPRAY 2 SPRAYS INTO EACH NOSTRIL EVERY DAY, Historical Med      furosemide (LASIX) 20 mg tablet Take 20 mg by mouth daily, Starting Tue 8/20/2019, Historical Med      KLOR-CON M10 10 MEQ tablet Take 10 mEq by mouth 2 (two) times a day, Starting Fri 11/8/2019, Historical Med      multivitamin (THERAGRAN) TABS Take 1 tablet by mouth daily, Historical Med      pantoprazole (PROTONIX) 40 mg tablet Take 1 tablet (40 mg total) by mouth 2 (two) times a day, Starting Wed 12/4/2019, Normal      Probiotic Product (PROBIOTIC-10 PO) Probiotic, Historical Med       !! - Potential duplicate medications found  Please discuss with provider  No discharge procedures on file      PDMP Review     None          ED Provider  Electronically Signed by           Jagdeep Syed MD  02/22/20 3548

## 2020-02-24 LAB — BACTERIA UR CULT: ABNORMAL

## 2020-02-26 ENCOUNTER — PREP FOR PROCEDURE (OUTPATIENT)
Dept: GASTROENTEROLOGY | Facility: CLINIC | Age: 70
End: 2020-02-26

## 2020-02-26 ENCOUNTER — OFFICE VISIT (OUTPATIENT)
Dept: GASTROENTEROLOGY | Facility: CLINIC | Age: 70
End: 2020-02-26
Payer: MEDICARE

## 2020-02-26 VITALS
HEIGHT: 62 IN | HEART RATE: 77 BPM | SYSTOLIC BLOOD PRESSURE: 140 MMHG | WEIGHT: 232.4 LBS | DIASTOLIC BLOOD PRESSURE: 88 MMHG | BODY MASS INDEX: 42.76 KG/M2

## 2020-02-26 DIAGNOSIS — Z86.010 HISTORY OF COLON POLYPS: Primary | ICD-10-CM

## 2020-02-26 DIAGNOSIS — R12 HEARTBURN: ICD-10-CM

## 2020-02-26 DIAGNOSIS — R19.7 DIARRHEA, UNSPECIFIED TYPE: ICD-10-CM

## 2020-02-26 PROCEDURE — 99213 OFFICE O/P EST LOW 20 MIN: CPT | Performed by: PHYSICIAN ASSISTANT

## 2020-02-26 NOTE — PATIENT INSTRUCTIONS
Nutrition Tips for Relief of Diarrhea   WHAT YOU NEED TO KNOW:   There are diet changes you can make to help relieve or stop diarrhea  These changes include limiting or avoiding foods and liquids that are high in sugar, fat, fiber, and lactose  Lactose is a sugar found in milk products  Milk products can cause diarrhea in people who are lactose intolerant  You should also drink extra liquids to replace fluids that are lost when you have diarrhea  Diarrhea can lead to dehydration  DISCHARGE INSTRUCTIONS:   Foods to limit or avoid:   · Dairy:      ¨ Whole milk    ¨ Half-and-half, cream, and sour cream    ¨ Regular (whole milk) ice cream    · Grains:      ¨ Whole wheat and whole grain breads, pasta, cereals, and crackers    ¨ Brown and wild rice    ¨ Breads and cereals with seeds or nuts    ¨ Popcorn    · Fruit and vegetables:      ¨ All raw fruits, except bananas and melon    ¨ Dried fruits, including prunes and raisins    ¨ Canned fruit in heavy syrup    ¨ Prune juice and any fruit juice with pulp    ¨ Raw vegetables, except lettuce     ¨ Fried vegetables    ¨ Corn, raw and cooked broccoli, cabbage, cauliflower, and amira greens    · Protein:      ¨ Fried meat, poultry, and fish    ¨ High-fat luncheon meats, such as bologna    ¨ Fatty meats, such as sausage, angelo, and hot dogs    ¨ Beans and nuts    · Liquids:      ¨ Sodas and fruit-flavored drinks    ¨ Drinks that contain caffeine, such as energy drinks, coffee, and tea     ¨ Drinks that contain alcohol or sugar alcohol, such as sorbitol  Foods and liquids you may eat or drink:  Most people can tolerate the foods and liquids listed below  If any of them make your symptoms worse, stop eating or drinking them until you feel better  If you are lactose intolerant, avoid milk products    · Dairy:      ¨ Skim or low-fat milk or evaporated milk    ¨ Soy milk or buttermilk     ¨ Low-fat, part-skim, and aged cheese    ¨ Yogurt, low-fat ice cream, or sherbert    · Grains: (Choose foods with less than 2 grams of dietary fiber per serving )     ¨ White or refined flour breads, bagels, pasta, and crackers    ¨ Cold or hot cereals made from white or refined flour such as puffed rice, cornflakes, or cream of wheat    ¨ White rice    · Fruit and vegetables:      ¨ Bananas or melon    ¨ Fruit juice without pulp, except prune juice    ¨ Canned fruit in juice or light syrup    ¨ Lettuce and most well-cooked vegetables without seeds or skins     ¨ Strained vegetable juice    · Protein:      ¨ Tender, well-cooked meat, poultry, or fish    ¨ Well-cooked eggs or soy foods (cooked without added fat)    ¨ Smooth nut butters    · Fats:  (Limit fats to less than 8 teaspoons a day)     ¨ Oil, butter, or margarine, or mayonnaise    ¨ Cream cheese or salad dressings    · Liquids:      ¨ For infants, breast milk or formula    ¨ Oral rehydration solution     ¨ Decaffeinated coffee or caffeine-free teas    ¨ Soft drinks without caffeine  Other guidelines to follow:   · Drink liquids as directed  You may need to drink more liquids than usual to prevent dehydration  Ask how much liquid to drink each day and which liquids are best for you  You may need to drink an oral rehydration solution (ORS)  An ORS helps replace fluids and electrolytes that you lose when you have diarrhea  · Eat small meals or snacks every 3 to 4 hours  instead of large meals  Continue eating even if you still have diarrhea  Your diarrhea will continue for a few days but should gradually go away  © 2017 [address] Information is for End User's use only and may not be sold, redistributed or otherwise used for commercial purposes  All illustrations and images included in CareNotes® are the copyrighted property of [employer]  or Jason Sanchez  The above information is an  only  It is not intended as medical advice for individual conditions or treatments   Talk to your doctor, nurse or pharmacist before following any medical regimen to see if it is safe and effective for you

## 2020-02-26 NOTE — PROGRESS NOTES
[facility] - Outpatient Follow-up Note  Marika Cardenas 81 y o  female MRN: [id number]  Encounter: [id number]          ASSESSMENT AND PLAN:      1  History of colon polyps  Will do colonoscopy  2  Diarrhea, unspecified type  Continue probiotics  Her diarrhea is now gone    3  Heartburn  Continue PPI  Will do EGD    ______________________________________________________________________    SUBJECTIVE:    79-year-old female who is here for follow-up of heartburn and diarrhea  Patient reports that she was doing well up until several weeks ago when she started having episodes of worsening heartburn that was different than her normal heartburn that she gets  Patient associated this also with weakness and fullness and inability to eat a normal meal   She reports that last week she actually had an episode of severe diarrhea that was explosive and she had no control over it  There was no melena or rectal bleeding  She reports that following this she tried to go to bed but she could not lift her legs up on the bed because she was so weak  She reports that she tried to sleep it off of the next morning she woke up and felt just as bad  Patient went to the emergency department was diagnosed with an E coli UTI  She is currently on Keflex  Patient is status post gastric bypass  Patient is due for repeat colonoscopy due to history of colon polyps  Her last colonoscopy was in 2014  Patient does report that her last EGD was 2 years ago  REVIEW OF SYSTEMS IS OTHERWISE NEGATIVE        Historical Information   Past Medical History:   Diagnosis Date    [facility])     [facility] [address] [facility]     Diverticulosis     [facility] ([facility] )     Hypoglycemia      Past Surgical History:   Procedure Laterality Date    CHOLECYSTECTOMY      COLONOSCOPY      GASTRIC BYPASS       Social History   Social History     Substance and Sexual Activity   Alcohol Use Not Currently     Social History Substance and Sexual Activity   Drug Use Never     Social History     Tobacco Use   Smoking Status Never Smoker   Smokeless Tobacco Never Used     Family History   Problem Relation Age of Onset    Cancer Mother     Heart disease Mother     Breast cancer additional onset Mother     Kidney disease Father     Hypertension Father        Meds/Allergies       Current Outpatient Medications:     ascorbic acid (VITAMIN C) 250 MG tablet    aspirin 81 MG tablet    Biotin 1 MG CAPS    Biotin 1 MG CAPS    Calcium Carbonate-Vitamin D3 600-400 MG-UNIT TABS    cephalexin (KEFLEX) 250 mg capsule    Cholecalciferol 50 MCG (2000 UT) CAPS    cyanocobalamin 1,000 mcg/mL    dicyclomine (BENTYL) 20 mg tablet    fluticasone (FLONASE) 50 mcg/act nasal spray    furosemide (LASIX) 20 mg tablet    KLOR-CON M10 10 MEQ tablet    multivitamin (THERAGRAN) TABS    pantoprazole (PROTONIX) 40 mg tablet    Probiotic Product (PROBIOTIC-10 PO)    No Known Allergies        Objective     Blood pressure 140/88, pulse 77, height 5' 2" (1 575 m), weight 105 kg (232 lb 6 4 oz)  Body mass index is 42 51 kg/m²  PHYSICAL EXAM:      General Appearance:   Alert, cooperative, no distress   HEENT:   Normocephalic, atraumatic, anicteric      Neck:  Supple, symmetrical, trachea midline   Lungs:   Clear to auscultation bilaterally; no rales, rhonchi or wheezing; respirations unlabored    Heart[de-identified]   Regular rate and rhythm; no murmur, rub, or gallop  Abdomen:   Soft, non-tender, non-distended; normal bowel sounds; no masses, no organomegaly    Genitalia:   Deferred    Rectal:   Deferred    Extremities:  No cyanosis, clubbing or edema    Pulses:  2+ and symmetric    Skin:  No jaundice, rashes, or lesions    Lymph nodes:  No palpable cervical lymphadenopathy        Lab Results:   No visits with results within 1 Day(s) from this visit     Latest known visit with results is:   Admission on 02/22/2020, Discharged on 02/22/2020   Component Date Value    WBC 02/22/2020 4 77     RBC 02/22/2020 4 40     Hemoglobin 02/22/2020 13 3     Hematocrit 02/22/2020 41 8     MCV 02/22/2020 95     MCH 02/22/2020 30 2     MCHC 02/22/2020 31 8     RDW 02/22/2020 14 6     MPV 02/22/2020 10 9     Platelets 50/88/4605 151     nRBC 02/22/2020 0     Neutrophils Relative 02/22/2020 53     Immat GRANS % 02/22/2020 0     Lymphocytes Relative 02/22/2020 24     Monocytes Relative 02/22/2020 14*    Eosinophils Relative 02/22/2020 8*    Basophils Relative 02/22/2020 1     Neutrophils Absolute 02/22/2020 2 58     Immature Grans Absolute 02/22/2020 0 01     Lymphocytes Absolute 02/22/2020 1 12     Monocytes Absolute 02/22/2020 0 65     Eosinophils Absolute 02/22/2020 0 36     Basophils Absolute 02/22/2020 0 05     Sodium 02/22/2020 142     Potassium 02/22/2020 3 7     Chloride 02/22/2020 108     CO2 02/22/2020 23     ANION GAP 02/22/2020 11     BUN 02/22/2020 20     Creatinine 02/22/2020 0 76     Glucose 02/22/2020 90     Calcium 02/22/2020 8 8     AST 02/22/2020 21     ALT 02/22/2020 25     Alkaline Phosphatase 02/22/2020 87     Total Protein 02/22/2020 7 3     Albumin 02/22/2020 3 3*    Total Bilirubin 02/22/2020 0 80     eGFR 02/22/2020 81     Color, UA 02/22/2020 Yellow     Clarity, UA 02/22/2020 Clear     Specific Gravity, UA 02/22/2020 <=1 005     pH, UA 02/22/2020 5 5     Leukocytes, UA 02/22/2020 Trace*    Nitrite, UA 02/22/2020 Positive*    Protein, UA 02/22/2020 Negative     Glucose, UA 02/22/2020 Negative     Ketones, UA 02/22/2020 Negative     Urobilinogen, UA 02/22/2020 0 2     Bilirubin, UA 02/22/2020 Negative     Blood, UA 02/22/2020 Negative     Lipase 02/22/2020 114     RBC, UA 02/22/2020 0-1*    WBC, UA 02/22/2020 10-20*    Epithelial Cells 02/22/2020 Occasional     Bacteria, UA 02/22/2020 Moderate*    Hyaline Casts, UA 02/22/2020 0-1*    MUCUS THREADS 02/22/2020 Occasional*    Urine Culture 02/22/2020 30,000-39,000 cfu/ml Escherichia coli*         Radiology Results:   Ct Abdomen Pelvis With Contrast    Result Date: 2/22/2020  Narrative: CT ABDOMEN AND PELVIS WITH IV CONTRAST INDICATION:   diarrhea, abdominal pain eval diverticulitis/colitis  COMPARISON:  5/9/2009  TECHNIQUE:  CT examination of the abdomen and pelvis was performed  Axial, sagittal, and coronal 2D reformatted images were created from the source data and submitted for interpretation  Radiation dose length product (DLP) for this visit:  706 mGy-cm   This examination, like all CT scans performed in the [facility], was performed utilizing techniques to minimize radiation dose exposure, including the use of iterative reconstruction and automated exposure control  IV Contrast:  100 mL of iohexol (OMNIPAQUE) Enteric Contrast:  Enteric contrast was not administered  FINDINGS: ABDOMEN LOWER CHEST:  Small hiatal hernia is noted  LIVER/BILIARY TREE:  Unremarkable  GALLBLADDER:  Gallbladder is surgically absent  SPLEEN:  Unremarkable  PANCREAS:  Unremarkable  ADRENAL GLANDS:  Unremarkable  KIDNEYS/URETERS:  Unremarkable  No hydronephrosis  STOMACH AND BOWEL:  Patient is status post gastric bypass  Colonic diverticulosis is present  APPENDIX:  A normal appendix was visualized  ABDOMINOPELVIC CAVITY:  No ascites or free intraperitoneal air  No lymphadenopathy  VESSELS:  IVC filter is present  PELVIS REPRODUCTIVE ORGANS:  Hysterectomy  URINARY BLADDER:  Unremarkable  ABDOMINAL WALL/INGUINAL REGIONS:  Unremarkable  OSSEOUS STRUCTURES:  No acute fracture or destructive osseous lesion  Impression: No acute intra-abdominal abnormality  Small hiatal hernia  Colonic diverticulosis   Workstation performed: [id number]

## 2020-02-26 NOTE — LETTER
February 26, 2020     Lyndsey Gonzales PA-C  [address]  [address] [address]    Patient: Mario Sparks   YOB: 1950   Date of Visit: 2/26/2020       Dear Dr Manoj Jean: Thank you for referring Mario Sparks to me for evaluation  Below are my notes for this consultation  If you have questions, please do not hesitate to call me  I look forward to following your patient along with you  Sincerely,        [place] PA-C        CC: No Recipients  Margaret Rosas  2/26/2020  1:35 PM  Sign at close encounter  [facility] - Outpatient Follow-up Note  Mario Sparks 81 y o  female MRN: [id number]  Encounter: [id number]          ASSESSMENT AND PLAN:      1  History of colon polyps  Will do colonoscopy  2  Diarrhea, unspecified type  Continue probiotics  Her diarrhea is now gone    3  Heartburn  Continue PPI  Will do EGD    ______________________________________________________________________    SUBJECTIVE:    28-year-old female who is here for follow-up of heartburn and diarrhea  Patient reports that she was doing well up until several weeks ago when she started having episodes of worsening heartburn that was different than her normal heartburn that she gets  Patient associated this also with weakness and fullness and inability to eat a normal meal   She reports that last week she actually had an episode of severe diarrhea that was explosive and she had no control over it  There was no melena or rectal bleeding  She reports that following this she tried to go to bed but she could not lift her legs up on the bed because she was so weak  She reports that she tried to sleep it off of the next morning she woke up and felt just as bad  Patient went to the emergency department was diagnosed with an E coli UTI  She is currently on Keflex  Patient is status post gastric bypass      Patient is due for repeat colonoscopy due to history of colon polyps  Her last colonoscopy was in 2014  Patient does report that her last EGD was 2 years ago  REVIEW OF SYSTEMS IS OTHERWISE NEGATIVE  Historical Information   Past Medical History:   Diagnosis Date    [facility] [address] [facility]     [facility]     Diverticulosis     [facility]     Hypoglycemia      Past Surgical History:   Procedure Laterality Date    CHOLECYSTECTOMY      COLONOSCOPY      GASTRIC BYPASS       Social History   Social History     Substance and Sexual Activity   Alcohol Use Not Currently     Social History     Substance and Sexual Activity   Drug Use Never     Social History     Tobacco Use   Smoking Status Never Smoker   Smokeless Tobacco Never Used     Family History   Problem Relation Age of Onset    Cancer Mother     Heart disease Mother     Breast cancer additional onset Mother     Kidney disease Father     Hypertension Father        Meds/Allergies       Current Outpatient Medications:     ascorbic acid (VITAMIN C) 250 MG tablet    aspirin 81 MG tablet    Biotin 1 MG CAPS    Biotin 1 MG CAPS    Calcium Carbonate-Vitamin D3 600-400 MG-UNIT TABS    cephalexin (KEFLEX) 250 mg capsule    Cholecalciferol 50 MCG (2000 UT) CAPS    cyanocobalamin 1,000 mcg/mL    dicyclomine (BENTYL) 20 mg tablet    fluticasone (FLONASE) 50 mcg/act nasal spray    furosemide (LASIX) 20 mg tablet    KLOR-CON M10 10 MEQ tablet    multivitamin (THERAGRAN) TABS    pantoprazole (PROTONIX) 40 mg tablet    Probiotic Product (PROBIOTIC-10 PO)    No Known Allergies        Objective     Blood pressure 140/88, pulse 77, height 5' 2" (1 575 m), weight 105 kg (232 lb 6 4 oz)  Body mass index is 42 51 kg/m²        PHYSICAL EXAM:      General Appearance:   Alert, cooperative, no distress   HEENT:   Normocephalic, atraumatic, anicteric      Neck:  Supple, symmetrical, trachea midline   Lungs:   Clear to auscultation bilaterally; no rales, rhonchi or wheezing; respirations Leukocytes, UA 02/22/2020 Trace*    Nitrite, UA 02/22/2020 Positive*    Protein, UA 02/22/2020 Negative     Glucose, UA 02/22/2020 Negative     Ketones, UA 02/22/2020 Negative     Urobilinogen, UA 02/22/2020 0 2     Bilirubin, UA 02/22/2020 Negative     Blood, UA 02/22/2020 Negative     Lipase 02/22/2020 114     RBC, UA 02/22/2020 0-1*    WBC, UA 02/22/2020 10-20*    Epithelial Cells 02/22/2020 Occasional     Bacteria, UA 02/22/2020 Moderate*    Hyaline Casts, UA 02/22/2020 0-1*    MUCUS THREADS 02/22/2020 Occasional*    Urine Culture 02/22/2020 30,000-39,000 cfu/ml Escherichia coli*         Radiology Results:   Ct Abdomen Pelvis With Contrast    Result Date: 2/22/2020  Narrative: CT ABDOMEN AND PELVIS WITH IV CONTRAST INDICATION:   diarrhea, abdominal pain eval diverticulitis/colitis  COMPARISON:  5/9/2009  TECHNIQUE:  CT examination of the abdomen and pelvis was performed  Axial, sagittal, and coronal 2D reformatted images were created from the source data and submitted for interpretation  Radiation dose length product (DLP) for this visit:  706 mGy-cm   This examination, like all CT scans performed in the [facility], was performed utilizing techniques to minimize radiation dose exposure, including the use of iterative reconstruction and automated exposure control  IV Contrast:  100 mL of iohexol (OMNIPAQUE) Enteric Contrast:  Enteric contrast was not administered  FINDINGS: ABDOMEN LOWER CHEST:  Small hiatal hernia is noted  LIVER/BILIARY TREE:  Unremarkable  GALLBLADDER:  Gallbladder is surgically absent  SPLEEN:  Unremarkable  PANCREAS:  Unremarkable  ADRENAL GLANDS:  Unremarkable  KIDNEYS/URETERS:  Unremarkable  No hydronephrosis  STOMACH AND BOWEL:  Patient is status post gastric bypass  Colonic diverticulosis is present  APPENDIX:  A normal appendix was visualized  ABDOMINOPELVIC CAVITY:  No ascites or free intraperitoneal air  No lymphadenopathy   VESSELS:  IVC filter is present  PELVIS REPRODUCTIVE ORGANS:  Hysterectomy  URINARY BLADDER:  Unremarkable  ABDOMINAL WALL/INGUINAL REGIONS:  Unremarkable  OSSEOUS STRUCTURES:  No acute fracture or destructive osseous lesion  Impression: No acute intra-abdominal abnormality  Small hiatal hernia  Colonic diverticulosis   Workstation performed: [id number]

## 2020-03-08 ENCOUNTER — ANESTHESIA EVENT (OUTPATIENT)
Dept: GASTROENTEROLOGY | Facility: HOSPITAL | Age: 70
End: 2020-03-08

## 2020-03-09 ENCOUNTER — ANESTHESIA (OUTPATIENT)
Dept: GASTROENTEROLOGY | Facility: HOSPITAL | Age: 70
End: 2020-03-09

## 2020-03-09 ENCOUNTER — HOSPITAL ENCOUNTER (OUTPATIENT)
Dept: GASTROENTEROLOGY | Facility: HOSPITAL | Age: 70
Setting detail: OUTPATIENT SURGERY
Discharge: HOME | End: 2020-03-09
Attending: INTERNAL MEDICINE
Payer: MEDICARE

## 2020-03-09 VITALS
HEART RATE: 63 BPM | DIASTOLIC BLOOD PRESSURE: 88 MMHG | TEMPERATURE: 97.5 F | HEIGHT: 63 IN | BODY MASS INDEX: 41.6 KG/M2 | RESPIRATION RATE: 18 BRPM | WEIGHT: 234.79 LBS | SYSTOLIC BLOOD PRESSURE: 150 MMHG | OXYGEN SATURATION: 99 %

## 2020-03-09 DIAGNOSIS — Z86.010 HISTORY OF COLON POLYPS: ICD-10-CM

## 2020-03-09 DIAGNOSIS — R12 HEARTBURN: ICD-10-CM

## 2020-03-09 PROCEDURE — NC001 PR NO CHARGE: Performed by: INTERNAL MEDICINE

## 2020-03-09 PROCEDURE — 88305 TISSUE EXAM BY PATHOLOGIST: CPT | Performed by: PATHOLOGY

## 2020-03-09 PROCEDURE — 43235 EGD DIAGNOSTIC BRUSH WASH: CPT | Performed by: INTERNAL MEDICINE

## 2020-03-09 PROCEDURE — 45380 COLONOSCOPY AND BIOPSY: CPT | Performed by: INTERNAL MEDICINE

## 2020-03-09 RX ORDER — SODIUM CHLORIDE, SODIUM LACTATE, POTASSIUM CHLORIDE, CALCIUM CHLORIDE 600; 310; 30; 20 MG/100ML; MG/100ML; MG/100ML; MG/100ML
100 INJECTION, SOLUTION INTRAVENOUS CONTINUOUS
Status: DISCONTINUED | OUTPATIENT
Start: 2020-03-09 | End: 2020-03-13 | Stop reason: HOSPADM

## 2020-03-09 RX ORDER — LIDOCAINE HYDROCHLORIDE 10 MG/ML
INJECTION, SOLUTION EPIDURAL; INFILTRATION; INTRACAUDAL; PERINEURAL AS NEEDED
Status: DISCONTINUED | OUTPATIENT
Start: 2020-03-09 | End: 2020-03-09 | Stop reason: SURG

## 2020-03-09 RX ORDER — PROPOFOL 10 MG/ML
INJECTION, EMULSION INTRAVENOUS AS NEEDED
Status: DISCONTINUED | OUTPATIENT
Start: 2020-03-09 | End: 2020-03-09 | Stop reason: SURG

## 2020-03-09 RX ORDER — CHLORAL HYDRATE 500 MG
1000 CAPSULE ORAL DAILY
COMMUNITY

## 2020-03-09 RX ADMIN — SODIUM CHLORIDE, SODIUM LACTATE, POTASSIUM CHLORIDE, AND CALCIUM CHLORIDE: .6; .31; .03; .02 INJECTION, SOLUTION INTRAVENOUS at 12:44

## 2020-03-09 RX ADMIN — LIDOCAINE HYDROCHLORIDE 50 MG: 10 INJECTION, SOLUTION EPIDURAL; INFILTRATION; INTRACAUDAL; PERINEURAL at 13:02

## 2020-03-09 RX ADMIN — PROPOFOL 20 MG: 10 INJECTION, EMULSION INTRAVENOUS at 13:16

## 2020-03-09 RX ADMIN — PROPOFOL 20 MG: 10 INJECTION, EMULSION INTRAVENOUS at 13:12

## 2020-03-09 RX ADMIN — PROPOFOL 100 MG: 10 INJECTION, EMULSION INTRAVENOUS at 13:02

## 2020-03-09 RX ADMIN — PROPOFOL 20 MG: 10 INJECTION, EMULSION INTRAVENOUS at 13:09

## 2020-03-09 RX ADMIN — PROPOFOL 20 MG: 10 INJECTION, EMULSION INTRAVENOUS at 13:05

## 2020-03-09 NOTE — H&P
History and Physical -  [facility]  Issa Santillan 81 y o  female MRN: [id number]      HPI: Issa Santillan is a 81y o  year old female who presents for evaluation of a history of colon polyps, heartburn, diarrhea      REVIEW OF SYSTEMS: Per the HPI, and otherwise unremarkable  Historical Information   Past Medical History:   Diagnosis Date    [facility] [address] [facility]     Colon polyp     Deviated septum     Diverticulosis     [facility]ometrial cancer ([facility]     Marlow filter in place     Hx of abdominoplasty     Hypoglycemia      Past Surgical History:   Procedure Laterality Date    ABDOMINOPLASTY      CHOLECYSTECTOMY      COLONOSCOPY      GASTRIC BYPASS       Social History   Social History     Substance and Sexual Activity   Alcohol Use Not Currently     Social History     Substance and Sexual Activity   Drug Use Never     Social History     Tobacco Use   Smoking Status Never Smoker   Smokeless Tobacco Never Used     Family History   Problem Relation Age of Onset    Cancer Mother     Heart disease Mother     Breast cancer additional onset Mother     Kidney disease Father     Hypertension Father        Meds/Allergies       (Not in a hospital admission)    No Known Allergies    Objective     Blood pressure 148/74, pulse 62, temperature 98 4 °F (36 9 °C), temperature source Temporal, resp  rate 18, height 5' 2 5" (1 588 m), weight 107 kg (234 lb 12 6 oz), SpO2 100 %  PHYSICAL EXAM    Gen: NAD  CV: RRR  CHEST: Clear  ABD: soft, NT/ND  EXT: no edema      ASSESSMENT/PLAN:  This is a 81y o  year old female here for esophagogastroduodenoscopy with biopsies, colonoscopy with biopsies, and she is stable and optimized for her procedure

## 2020-03-09 NOTE — ANESTHESIA PREPROCEDURE EVALUATION
Review of Systems/Medical History  Patient summary reviewed  Chart reviewed  No history of anesthetic complications     Cardiovascular  Negative cardio ROS Exercise tolerance (METS): >4,     Pulmonary  Not a smoker , No recent URI , Sleep apnea (mild - was told no cpap needed) Sleep Study completed,        GI/Hepatic    GERD , Bariatric surgery, Bowel prep       Negative  ROS        Endo/Other    Obesity (BMI 42)  morbid obesity   GYN    Uterine cancer,        Hematology  Negative hematology ROS      Musculoskeletal  Negative musculoskeletal ROS        Neurology  Negative neurology ROS      Psychology   Negative psychology ROS              Physical Exam    Airway    Mallampati score: II  TM Distance: >3 FB  Neck ROM: full     Dental   No notable dental hx     Cardiovascular  Comment: Negative ROS,     Pulmonary      Other Findings      Lab Results   Component Value Date    WBC 4 77 02/22/2020    HGB 13 3 02/22/2020     02/22/2020     Lab Results   Component Value Date    SODIUM 142 02/22/2020    K 3 7 02/22/2020    BUN 20 02/22/2020    CREATININE 0 76 02/22/2020    EGFR 81 02/22/2020     Anesthesia Plan  ASA Score- 3     Anesthesia Type- IV sedation with anesthesia with ASA Monitors  Additional Monitors:   Airway Plan:         Plan Factors-    Induction- intravenous  Postoperative Plan-     Informed Consent- Anesthetic plan and risks discussed with patient  I personally reviewed this patient with the CRNA  Discussed and agreed on the Anesthesia Plan with the CRNA  Fransisca Titus

## 2020-03-09 NOTE — DISCHARGE INSTRUCTIONS

## 2020-03-09 NOTE — ANESTHESIA POSTPROCEDURE EVALUATION
Post-Op Assessment Note    CV Status:  Stable    Pain management: adequate     Mental Status:  Alert and awake   Hydration Status:  Euvolemic   PONV Controlled:  Controlled   Airway Patency:  Patent   Post Op Vitals Reviewed: Yes      Staff: CRNA           BP   126/78   Temp      Pulse  69   Resp   18   SpO2   98

## 2020-03-11 ENCOUNTER — TELEPHONE (OUTPATIENT)
Dept: GASTROENTEROLOGY | Facility: CLINIC | Age: 70
End: 2020-03-11

## 2020-03-11 NOTE — TELEPHONE ENCOUNTER
----- Message from Lindsey Allen PA-C sent at 3/11/2020 10:54 AM EDT -----  Please let her know her biopsies were benigng

## 2020-12-21 ENCOUNTER — TELEPHONE (OUTPATIENT)
Dept: GASTROENTEROLOGY | Facility: CLINIC | Age: 70
End: 2020-12-21

## 2020-12-21 DIAGNOSIS — K21.9 GASTROESOPHAGEAL REFLUX DISEASE WITHOUT ESOPHAGITIS: ICD-10-CM

## 2020-12-21 RX ORDER — PANTOPRAZOLE SODIUM 40 MG/1
TABLET, DELAYED RELEASE ORAL
Qty: 180 TABLET | Refills: 2 | Status: SHIPPED | OUTPATIENT
Start: 2020-12-21 | End: 2021-10-05

## 2021-01-06 ENCOUNTER — OFFICE VISIT (OUTPATIENT)
Dept: GASTROENTEROLOGY | Facility: CLINIC | Age: 71
End: 2021-01-06
Payer: MEDICARE

## 2021-01-06 VITALS
DIASTOLIC BLOOD PRESSURE: 86 MMHG | BODY MASS INDEX: 46.26 KG/M2 | WEIGHT: 235.6 LBS | HEIGHT: 60 IN | HEART RATE: 97 BPM | SYSTOLIC BLOOD PRESSURE: 148 MMHG

## 2021-01-06 DIAGNOSIS — R14.0 ABDOMINAL BLOATING: ICD-10-CM

## 2021-01-06 DIAGNOSIS — R14.1 ABDOMINAL GAS PAIN: ICD-10-CM

## 2021-01-06 DIAGNOSIS — K21.9 GASTROESOPHAGEAL REFLUX DISEASE WITHOUT ESOPHAGITIS: Primary | ICD-10-CM

## 2021-01-06 DIAGNOSIS — K59.00 CONSTIPATION, UNSPECIFIED CONSTIPATION TYPE: ICD-10-CM

## 2021-01-06 PROCEDURE — 99213 OFFICE O/P EST LOW 20 MIN: CPT | Performed by: PHYSICIAN ASSISTANT

## 2021-01-06 NOTE — LETTER
January 6, 2021     Eda Brasher, [address] [facility]  [address] [address]    Patient: Brittney January   YOB: 1950   Date of Visit: 1/6/2021       Dear Dr Criselda Velazquez: Thank you for referring Brittney January to me for evaluation  Below are my notes for this consultation  If you have questions, please do not hesitate to call me  I look forward to following your patient along with you  Sincerely,        Yanni Cerda PA-C        CC: No Recipients  Margaret Cruz  1/6/2021  2:56 PM  Sign when Signing Visit  [facility] - Outpatient Follow-up Note  Brittney January 78 y o  female MRN: [id number]  Encounter: [id number]          ASSESSMENT AND PLAN:      1  Gastroesophageal reflux disease without esophagitis  2  Abdominal bloating  3  Constipation, unspecified constipation type  4  Abdominal gas pain  Will start patient on 1/2 cap of MiraLax daily  Will continue pantoprazole 40 mg b i d     Will place patient on another trial of Xifaxan  Patient will continue to utilize Gas-X p r n  No plans for any repeat endoscopic evaluation  Patient is going to cut carbs and sugars  ______________________________________________________________________    SUBJECTIVE:    72-year-old female very well known to us who presents for follow-up of gastroesophageal reflux disease, constipation, abdominal bloating and gas  Patient reports for the most part she is feeling well but has had a flare-up of abdominal gas distention and constipation most recently  Patient reports that when she did do a Xifaxan course a year and a half ago all of her symptoms resolved  Patient reports she is utilizing MiraLax now as needed for her constipation  She is still reporting fragmented stools  Patient did undergo endoscopic evaluation in March of 2020  Patient denies any alarm symptoms    Patient reports she is going to drastically changed her diet because she does need to lose about 55 lb  Patient is status post gastric bypass  REVIEW OF SYSTEMS IS OTHERWISE NEGATIVE  Historical Information   Past Medical History:   Diagnosis Date    [facility] [address] [facility]     Colon polyp     Deviated septum     Diverticulosis     Endometrial cancer (HCC)     Slovan filter in place     Hx of abdominoplasty     Hypoglycemia      Past Surgical History:   Procedure Laterality Date    ABDOMINOPLASTY      CHOLECYSTECTOMY      COLONOSCOPY      GASTRIC BYPASS       Social History   Social History     Substance and Sexual Activity   Alcohol Use Not Currently     Social History     Substance and Sexual Activity   Drug Use Never     Social History     Tobacco Use   Smoking Status Never Smoker   Smokeless Tobacco Never Used     Family History   Problem Relation Age of Onset    Cancer Mother     Heart disease Mother     Breast cancer additional onset Mother     Kidney disease Father     Hypertension Father        Meds/Allergies       Current Outpatient Medications:     ascorbic acid (VITAMIN C) 250 MG tablet    aspirin 81 MG tablet    Biotin 1 MG CAPS    Calcium Carbonate-Vitamin D3 600-400 MG-UNIT TABS    Cholecalciferol 50 MCG (2000 UT) CAPS    cyanocobalamin 1,000 mcg/mL    fluticasone (FLONASE) 50 mcg/act nasal spray    furosemide (LASIX) 20 mg tablet    KLOR-CON M10 10 MEQ tablet    Mirabegron ER (Myrbetriq) 50 MG TB24    multivitamin (THERAGRAN) TABS    Omega-3 Fatty Acids (FISH OIL) 1,000 mg    pantoprazole (PROTONIX) 40 mg tablet    Probiotic Product (PROBIOTIC-10 PO)    Biotin 1 MG CAPS    Thiamine HCl (VITAMIN B-1 PO)    No Known Allergies        Objective     Blood pressure 148/86, pulse 97, height 5' 0 25" (1 53 m), weight 107 kg (235 lb 9 6 oz)  Body mass index is 45 63 kg/m²        PHYSICAL EXAM:      General Appearance:   Alert, cooperative, no distress   HEENT:   Normocephalic, atraumatic, anicteric      Neck:  Supple, symmetrical, trachea midline Lungs:   Clear to auscultation bilaterally; no rales, rhonchi or wheezing; respirations unlabored    Heart[de-identified]   Regular rate and rhythm; no murmur, rub, or gallop  Abdomen:   Soft, non-tender, non-distended; normal bowel sounds; no masses, no organomegaly    Genitalia:   Deferred    Rectal:   Deferred    Extremities:  No cyanosis, clubbing or edema    Pulses:  2+ and symmetric    Skin:  No jaundice, rashes, or lesions    Lymph nodes:  No palpable cervical lymphadenopathy        Lab Results:   No visits with results within 1 Day(s) from this visit  Latest known visit with results is:   Hospital Outpatient Visit on 03/09/2020   Component Date Value    Case Report 03/09/2020                      Value:Surgical Pathology Report                         Case: [id number]                                   Authorizing Provider:  Mary Lazaro DO          Collected:           03/09/2020 1312              Ordering Location:      [facility]       Received:            03/09/2020 [facility]                                                             Pathologist:           Zohaib Manning MD                                                               Specimen:    Colon, random ascending/sigmoid                                                            Final Diagnosis 03/09/2020                      Value: This result contains rich text formatting which cannot be displayed here   Additional Information 03/09/2020                      Value: This result contains rich text formatting which cannot be displayed here  Martinez Martines Gross Description 03/09/2020                      Value: This result contains rich text formatting which cannot be displayed here   Clinical Information 03/09/2020                      Value:Random bx r/o microscopic colitis         Radiology Results:   No results found

## 2021-01-06 NOTE — PROGRESS NOTES
[facility] - Outpatient Follow-up Note  Rajwinder Carlin 78 y o  female MRN: [id number]  Encounter: [id number]          ASSESSMENT AND PLAN:      1  Gastroesophageal reflux disease without esophagitis  2  Abdominal bloating  3  Constipation, unspecified constipation type  4  Abdominal gas pain  Will start patient on 1/2 cap of MiraLax daily  Will continue pantoprazole 40 mg b i d     Will place patient on another trial of Xifaxan  Patient will continue to utilize Gas-X p r n  No plans for any repeat endoscopic evaluation  Patient is going to cut carbs and sugars  ______________________________________________________________________    SUBJECTIVE:    63-year-old female very well known to us who presents for follow-up of gastroesophageal reflux disease, constipation, abdominal bloating and gas  Patient reports for the most part she is feeling well but has had a flare-up of abdominal gas distention and constipation most recently  Patient reports that when she did do a Xifaxan course a year and a half ago all of her symptoms resolved  Patient reports she is utilizing MiraLax now as needed for her constipation  She is still reporting fragmented stools  Patient did undergo endoscopic evaluation in March of 2020  Patient denies any alarm symptoms  Patient reports she is going to drastically changed her diet because she does need to lose about 55 lb  Patient is status post gastric bypass  REVIEW OF SYSTEMS IS OTHERWISE NEGATIVE        Historical Information   Past Medical History:   Diagnosis Date    Cancer [facility])     Colon polyp     Deviated septum     Diverticulosis     [facility]ometrial [facility] )     [place] filter in place     Hx of abdominoplasty     Hypoglycemia      Past Surgical History:   Procedure Laterality Date    ABDOMINOPLASTY      CHOLECYSTECTOMY      COLONOSCOPY      GASTRIC BYPASS       Social History   Social History     Substance and Sexual Activity   Alcohol Use Not Currently     Social History     Substance and Sexual Activity   Drug Use Never     Social History     Tobacco Use   Smoking Status Never Smoker   Smokeless Tobacco Never Used     Family History   Problem Relation Age of Onset    Cancer Mother     Heart disease Mother     Breast cancer additional onset Mother     Kidney disease Father     Hypertension Father        Meds/Allergies       Current Outpatient Medications:     ascorbic acid (VITAMIN C) 250 MG tablet    aspirin 81 MG tablet    Biotin 1 MG CAPS    Calcium Carbonate-Vitamin D3 600-400 MG-UNIT TABS    Cholecalciferol 50 MCG (2000 UT) CAPS    cyanocobalamin 1,000 mcg/mL    fluticasone (FLONASE) 50 mcg/act nasal spray    furosemide (LASIX) 20 mg tablet    KLOR-CON M10 10 MEQ tablet    Mirabegron ER (Myrbetriq) 50 MG TB24    multivitamin (THERAGRAN) TABS    Omega-3 Fatty Acids (FISH OIL) 1,000 mg    pantoprazole (PROTONIX) 40 mg tablet    Probiotic Product (PROBIOTIC-10 PO)    Biotin 1 MG CAPS    Thiamine HCl (VITAMIN B-1 PO)    No Known Allergies        Objective     Blood pressure 148/86, pulse 97, height 5' 0 25" (1 53 m), weight 107 kg (235 lb 9 6 oz)  Body mass index is 45 63 kg/m²  PHYSICAL EXAM:      General Appearance:   Alert, cooperative, no distress   HEENT:   Normocephalic, atraumatic, anicteric      Neck:  Supple, symmetrical, trachea midline   Lungs:   Clear to auscultation bilaterally; no rales, rhonchi or wheezing; respirations unlabored    Heart[de-identified]   Regular rate and rhythm; no murmur, rub, or gallop  Abdomen:   Soft, non-tender, non-distended; normal bowel sounds; no masses, no organomegaly    Genitalia:   Deferred    Rectal:   Deferred    Extremities:  No cyanosis, clubbing or edema    Pulses:  2+ and symmetric    Skin:  No jaundice, rashes, or lesions    Lymph nodes:  No palpable cervical lymphadenopathy        Lab Results:   No visits with results within 1 Day(s) from this visit     Latest known visit with results is:   Hospital Outpatient Visit on 03/09/2020   Component Date Value    Case Report 03/09/2020                      Value:Surgical Pathology Report                         Case: [id number]                                   Authorizing Provider:  Mario Ayon DO          Collected:           03/09/2020 1312              Ordering Location:      [facility]       Received:            03/09/2020 [address] [place] [address] Endoscopy                                                             Pathologist:           Ketan Castro MD                                                               Specimen:    Colon, random ascending/sigmoid                                                            Final Diagnosis 03/09/2020                      Value: This result contains rich text formatting which cannot be displayed here   Additional Information 03/09/2020                      Value: This result contains rich text formatting which cannot be displayed here  Turner Gross Description 03/09/2020                      Value: This result contains rich text formatting which cannot be displayed here   Clinical Information 03/09/2020                      Value:Random bx r/o microscopic colitis         Radiology Results:   No results found

## 2021-01-06 NOTE — PATIENT INSTRUCTIONS
Abdominal Pain   WHAT YOU NEED TO KNOW:   Abdominal pain can be dull, achy, or sharp  You may have pain in one area of your abdomen, or in your entire abdomen  Your pain may be caused by a condition such as constipation, food sensitivity or poisoning, infection, or a blockage  Abdominal pain can also be from a hernia, appendicitis, or an ulcer  Liver, gallbladder, or kidney conditions can also cause abdominal pain  The cause of your abdominal pain may be unknown  DISCHARGE INSTRUCTIONS:   Return to the emergency department if:   · You have new chest pain or shortness of breath  · You have pulsing pain in your upper abdomen or lower back that suddenly becomes constant  · Your pain is in the right lower abdominal area and worsens with movement  · You have a fever over 100 4°F (38°C) or shaking chills  · You are vomiting and cannot keep food or liquids down  · Your pain does not improve or gets worse over the next 8 to 12 hours  · You see blood in your vomit or bowel movements, or they look black and tarry  · Your skin or the whites of your eyes turn yellow  · You are a woman and have a large amount of vaginal bleeding that is not your monthly period  Contact your healthcare provider if:   · You have pain in your lower back  · You are a man and have pain in your testicles  · You have pain when you urinate  · You have questions or concerns about your condition or care  Follow up with your healthcare provider within 24 hours or as directed:  Write down your questions so you remember to ask them during your visits  Medicines:   · Medicines  may be given to calm your stomach and prevent vomiting or to decrease pain  Ask how to take pain medicine safely  · Take your medicine as directed  Contact your healthcare provider if you think your medicine is not helping or if you have side effects  Tell him of her if you are allergic to any medicine   Keep a list of the medicines, vitamins, and herbs you take  Include the amounts, and when and why you take them  Bring the list or the pill bottles to follow-up visits  Carry your medicine list with you in case of an emergency  © Copyright [address] Information is for End User's use only and may not be sold, redistributed or otherwise used for commercial purposes  All illustrations and images included in CareNotes® are the copyrighted property of A D A M , Inc  or [facility] Kathryn Sue   The above information is an  only  It is not intended as medical advice for individual conditions or treatments  Talk to your doctor, nurse or pharmacist before following any medical regimen to see if it is safe and effective for you

## 2021-02-10 ENCOUNTER — OFFICE VISIT (OUTPATIENT)
Dept: GASTROENTEROLOGY | Facility: CLINIC | Age: 71
End: 2021-02-10
Payer: MEDICARE

## 2021-02-10 VITALS
WEIGHT: 235.2 LBS | DIASTOLIC BLOOD PRESSURE: 98 MMHG | SYSTOLIC BLOOD PRESSURE: 144 MMHG | BODY MASS INDEX: 46.17 KG/M2 | HEART RATE: 70 BPM | HEIGHT: 60 IN

## 2021-02-10 DIAGNOSIS — R14.0 ABDOMINAL BLOATING: ICD-10-CM

## 2021-02-10 DIAGNOSIS — K59.00 CONSTIPATION, UNSPECIFIED CONSTIPATION TYPE: Primary | ICD-10-CM

## 2021-02-10 PROCEDURE — 99213 OFFICE O/P EST LOW 20 MIN: CPT | Performed by: PHYSICIAN ASSISTANT

## 2021-02-10 NOTE — PROGRESS NOTES
[facility] - Outpatient Follow-up Note  Martin El 78 y o  female MRN: [id number]  Encounter: [id number]          ASSESSMENT AND PLAN:      1  Constipation, unspecified constipation type  2  Abdominal bloating  Will start patient on Linzess 72 micro g daily  Patient will try to continue to work on cutting carbohydrates  Patient will continue Gas-X as needed  Patient will continue pantoprazole 40 mg twice a day   ______________________________________________________________________    SUBJECTIVE:    82-year-old female who is here for follow-up  Patient is still reporting ongoing issues with constipation despite MiraLax therapy  Patient reports anal pressure  Patient reports that her abdominal bloating and gas has improved secondary to the fact that she is taking Gas-X  She reports she has not really changed her diet at all since her last appointment  She reports she is still currently taking PPI therapy at twice a day dosage  She reports that her heartburn seems to be under control  She denies any alarm symptoms  REVIEW OF SYSTEMS IS OTHERWISE NEGATIVE        Historical Information   Past Medical History:   Diagnosis Date    Cancer [facility] )     Colon polyp     Deviated septum     Diverticulosis     Endometrial cancer ([facility] )     [place] filter in place     Hx of abdominoplasty     Hypoglycemia      Past Surgical History:   Procedure Laterality Date    ABDOMINOPLASTY      CHOLECYSTECTOMY      COLONOSCOPY      GASTRIC BYPASS       Social History   Social History     Substance and Sexual Activity   Alcohol Use Not Currently     Social History     Substance and Sexual Activity   Drug Use Never     Social History     Tobacco Use   Smoking Status Never Smoker   Smokeless Tobacco Never Used     Family History   Problem Relation Age of Onset    Cancer Mother     Heart disease Mother     Breast cancer additional onset Mother     Kidney disease Father    Shazia Miramontes Hypertension Father        Meds/Allergies       Current Outpatient Medications:     ascorbic acid (VITAMIN C) 250 MG tablet    aspirin 81 MG tablet    Biotin 1 MG CAPS    Biotin 1 MG CAPS    Calcium Carbonate-Vitamin D3 600-400 MG-UNIT TABS    Cholecalciferol 50 MCG (2000 UT) CAPS    cyanocobalamin 1,000 mcg/mL    fluticasone (FLONASE) 50 mcg/act nasal spray    furosemide (LASIX) 20 mg tablet    KLOR-CON M10 10 MEQ tablet    Mirabegron ER (Myrbetriq) 50 MG TB24    multivitamin (THERAGRAN) TABS    Omega-3 Fatty Acids (FISH OIL) 1,000 mg    pantoprazole (PROTONIX) 40 mg tablet    Probiotic Product (PROBIOTIC-10 PO)    Thiamine HCl (VITAMIN B-1 PO)    No Known Allergies        Objective     Blood pressure 144/98, pulse 70, height 5' 0 25" (1 53 m), weight 107 kg (235 lb 3 2 oz)  Body mass index is 45 55 kg/m²  PHYSICAL EXAM:      General Appearance:   Alert, cooperative, no distress   HEENT:   Normocephalic, atraumatic, anicteric      Neck:  Supple, symmetrical, trachea midline   Lungs:   Clear to auscultation bilaterally; no rales, rhonchi or wheezing; respirations unlabored    Heart[de-identified]   Regular rate and rhythm; no murmur, rub, or gallop  Abdomen:   Soft, non-tender, non-distended; normal bowel sounds; no masses, no organomegaly    Genitalia:   Deferred    Rectal:   Deferred    Extremities:  No cyanosis, clubbing or edema    Pulses:  2+ and symmetric    Skin:  No jaundice, rashes, or lesions    Lymph nodes:  No palpable cervical lymphadenopathy        Lab Results:   No visits with results within 1 Day(s) from this visit     Latest known visit with results is:   Hospital Outpatient Visit on 03/09/2020   Component Date Value    Case Report 03/09/2020                      Value:Surgical Pathology Report                         Case: [id number]                                   Authorizing Provider:  Linda Grimes DO          Collected:           03/09/2020 1312              Ordering Location: [facility]       Received:            03/09/2020 [facility] Endoscopy                                                             Pathologist:           Felix Mehta MD                                                               Specimen:    Colon, random ascending/sigmoid                                                            Final Diagnosis 03/09/2020                      Value: This result contains rich text formatting which cannot be displayed here   Additional Information 03/09/2020                      Value: This result contains rich text formatting which cannot be displayed here  [place] Gross Description 03/09/2020                      Value: This result contains rich text formatting which cannot be displayed here   Clinical Information 03/09/2020                      Value:Random bx r/o microscopic colitis         Radiology Results:   No results found

## 2021-02-10 NOTE — PATIENT INSTRUCTIONS
Gas and Bloating   WHAT YOU NEED TO KNOW:   Gas forms inside your body when you eat certain foods or swallow too much air  Bloating is the tight, full feeling you get from too much gas  DISCHARGE INSTRUCTIONS:   Medicines:   · Gas relief medicines: These may help decrease gas pain and bloating  These can be bought without a doctor's order  · Take your medicine as directed  Contact your healthcare provider if you think your medicine is not helping or if you have side effects  Tell him or her if you are allergic to any medicine  Keep a list of the medicines, vitamins, and herbs you take  Include the amounts, and when and why you take them  Bring the list or the pill bottles to follow-up visits  Carry your medicine list with you in case of an emergency  How to manage gas and bloating:   · Keep a log:  Write down what you eat and drink and how often you pass gas each day  · Eat and drink slowly:  Choose foods that do not cause gas, such as meat, poultry, fish, and eggs  Avoid high-fat foods and vegetables or starches that can cause gas  Do not drink carbonated drinks  Add foods back into your diet one at a time after about a week  If the food causes symptoms, avoid it  · Exercise:  Exercise can help relieve gas  · Do not smoke or chew gum: This can cause you to swallow air  · Make sure your dentures fit properly:  Have your dentures fixed if they are loose  Loose dentures can cause you to swallow too much air  Follow up with your healthcare provider as directed:  Write down your questions so you remember to ask them during your visits  Contact your healthcare provider if:   · You have a fever  · You vomit or have diarrhea  · You lose weight without trying  · You have questions or concerns about your condition or care  Return to the emergency department if:   · You have severe abdominal pain  · You have blood in your bowel movement      © Copyright [facility] [address] Information is for End User's use only and may not be sold, redistributed or otherwise used for commercial purposes  All illustrations and images included in CareNotes® are the copyrighted property of A D A M , Inc  or [employer] Val[employer]  The above information is an  only  It is not intended as medical advice for individual conditions or treatments  Talk to your doctor, nurse or pharmacist before following any medical regimen to see if it is safe and effective for you

## 2021-02-15 DIAGNOSIS — K59.00 CONSTIPATION, UNSPECIFIED CONSTIPATION TYPE: Primary | ICD-10-CM

## 2021-02-15 NOTE — TELEPHONE ENCOUNTER
----- Message from Bozena Gama sent at 2/13/2021  6:30 PM EST -----  Regarding: Non-Urgent Medical Question  Contact: [phone]  I'm just letting you know the Annabella Comment worked but can be a little stronger , also took B/p after taking water pill it was 106/72

## 2021-02-16 RX ORDER — LINACLOTIDE 72 UG/1
72 CAPSULE, GELATIN COATED ORAL DAILY
Qty: 30 CAPSULE | Refills: 3 | Status: SHIPPED | OUTPATIENT
Start: 2021-02-16 | End: 2021-03-18

## 2021-02-16 NOTE — TELEPHONE ENCOUNTER
Spoke with patient t she states she has been having daily bowel movement she will continue with the 72 mcg  daily for now and would like RX sent to [facility]

## 2021-02-19 ENCOUNTER — TELEPHONE (OUTPATIENT)
Dept: GASTROENTEROLOGY | Facility: CLINIC | Age: 71
End: 2021-02-19

## 2021-02-22 ENCOUNTER — TELEPHONE (OUTPATIENT)
Dept: GASTROENTEROLOGY | Facility: CLINIC | Age: 71
End: 2021-02-22

## 2021-02-22 NOTE — TELEPHONE ENCOUNTER
Submitted PA through [email or web address]  Key: [id number]    Pharmacy  Benefit:  [facility]  ID# [id number]  BIN: [id number]  PCN: [id number] and advised

## 2021-05-05 ENCOUNTER — OFFICE VISIT (OUTPATIENT)
Dept: GASTROENTEROLOGY | Facility: CLINIC | Age: 71
End: 2021-05-05
Payer: MEDICARE

## 2021-05-05 VITALS
BODY MASS INDEX: 46.72 KG/M2 | HEIGHT: 60 IN | WEIGHT: 238 LBS | HEART RATE: 80 BPM | DIASTOLIC BLOOD PRESSURE: 82 MMHG | SYSTOLIC BLOOD PRESSURE: 130 MMHG

## 2021-05-05 DIAGNOSIS — K21.9 GASTROESOPHAGEAL REFLUX DISEASE WITHOUT ESOPHAGITIS: ICD-10-CM

## 2021-05-05 DIAGNOSIS — K59.00 CONSTIPATION, UNSPECIFIED CONSTIPATION TYPE: Primary | ICD-10-CM

## 2021-05-05 DIAGNOSIS — R14.0 ABDOMINAL BLOATING: ICD-10-CM

## 2021-05-05 PROCEDURE — 99213 OFFICE O/P EST LOW 20 MIN: CPT | Performed by: PHYSICIAN ASSISTANT

## 2021-05-05 RX ORDER — LINACLOTIDE 72 UG/1
1 CAPSULE, GELATIN COATED ORAL DAILY
COMMUNITY
Start: 2021-03-29 | End: 2021-08-06 | Stop reason: SDUPTHER

## 2021-05-05 NOTE — PROGRESS NOTES
[facility] - Outpatient Follow-up Note  Caitlyn Walton 73 y o  female MRN: [id number]  Encounter: [id number]          ASSESSMENT AND PLAN:      1  Constipation, unspecified constipation type  2  Abdominal bloating  3  Gastroesophageal reflux disease without esophagitis  -Will continue Linzess 72 micro g daily     -Patient will continue Gas-X as needed     -Patient will continue 10 you to try to work on weight loss     -We have had a long discussion about eating 6 small meals a day     -Limiting her carbohydrates and sugars  ______________________________________________________________________    SUBJECTIVE:     77-year-old female who presents for follow-up of abdominal gas, constipation, GERD  Patient reports she is feeling quite well  She reports that the Linzess 72 micro g has really helped her constipation symptoms  She reports her abdominal bloating as well as discomfort is also improved  She reports she had an episode of fluttering in her chest which she believes is related to gas  Patient reports she takes Gas-X for the symptom and it helps  She denies any alarm symptoms     Her biggest concern right now that she is gaining weight  We had a long discussion about dietary modifications  REVIEW OF SYSTEMS IS OTHERWISE NEGATIVE        Historical Information   Past Medical History:   Diagnosis Date    Cancer [facility])     Colon polyp     Deviated septum     Diverticulosis     [facility]     Michelle filter in place     Hx of abdominoplasty     Hypoglycemia      Past Surgical History:   Procedure Laterality Date    ABDOMINOPLASTY      CHOLECYSTECTOMY      COLONOSCOPY      GASTRIC BYPASS       Social History   Social History     Substance and Sexual Activity   Alcohol Use Not Currently     Social History     Substance and Sexual Activity   Drug Use Never     Social History     Tobacco Use   Smoking Status Never Smoker   Smokeless Tobacco Never Used Family History   Problem Relation Age of Onset    Cancer Mother     Heart disease Mother     Breast cancer additional onset Mother     Kidney disease Father     Hypertension Father        Meds/Allergies       Current Outpatient Medications:     ascorbic acid (VITAMIN C) 250 MG tablet    aspirin 81 MG tablet    Biotin 1 MG CAPS    Biotin 1 MG CAPS    Calcium Carbonate-Vitamin D3 600-400 MG-UNIT TABS    Cholecalciferol 50 MCG (2000 UT) CAPS    cyanocobalamin 1,000 mcg/mL    fluticasone (FLONASE) 50 mcg/act nasal spray    furosemide (LASIX) 20 mg tablet    KLOR-CON M10 10 MEQ tablet    Linzess 72 MCG CAPS    Mirabegron ER (Myrbetriq) 50 MG TB24    multivitamin (THERAGRAN) TABS    Omega-3 Fatty Acids (FISH OIL) 1,000 mg    pantoprazole (PROTONIX) 40 mg tablet    Probiotic Product (PROBIOTIC-10 PO)    Thiamine HCl (VITAMIN B-1 PO)    No Known Allergies        Objective     Blood pressure 130/82, pulse 80, height 5' (1 524 m), weight 108 kg (238 lb)  Body mass index is 46 48 kg/m²  PHYSICAL EXAM:      General Appearance:   Alert, cooperative, no distress   HEENT:   Normocephalic, atraumatic, anicteric      Neck:  Supple, symmetrical, trachea midline   Lungs:   Clear to auscultation bilaterally; no rales, rhonchi or wheezing; respirations unlabored    Heart[de-identified]   Regular rate and rhythm; no murmur, rub, or gallop  Abdomen:   Soft, non-tender, non-distended; normal bowel sounds; no masses, no organomegaly    Genitalia:   Deferred    Rectal:   Deferred    Extremities:  No cyanosis, clubbing or edema    Pulses:  2+ and symmetric    Skin:  No jaundice, rashes, or lesions    Lymph nodes:  No palpable cervical lymphadenopathy        Lab Results:   No visits with results within 1 Day(s) from this visit     Latest known visit with results is:   Hospital Outpatient Visit on 03/09/2020   Component Date Value    Case Report 03/09/2020                      Value:Surgical Pathology Report Case: [id number]                                   Authorizing Provider:  Ryann Galloway DO          Collected:           03/09/2020 1312              Ordering Location:      [facility]       Received:            03/09/2020 [address] [place] [address] Endoscopy                                                             Pathologist:           Ky Black MD                                                               Specimen:    Colon, random ascending/sigmoid                                                            Final Diagnosis 03/09/2020                      Value: This result contains rich text formatting which cannot be displayed here   Additional Information 03/09/2020                      Value: This result contains rich text formatting which cannot be displayed here  Dejuan Lemme Gross Description 03/09/2020                      Value: This result contains rich text formatting which cannot be displayed here   Clinical Information 03/09/2020                      Value:Random bx r/o microscopic colitis         Radiology Results:   No results found

## 2021-05-05 NOTE — PATIENT INSTRUCTIONS
Constipation   WHAT YOU NEED TO KNOW:   Constipation is when you have hard, dry bowel movements, or you go longer than usual between bowel movements  DISCHARGE INSTRUCTIONS:   Call your doctor if:   · You have blood in your bowel movements  · You have a fever and abdominal pain with the constipation  · Your constipation gets worse  · You start to vomit  · You have questions or concerns about your condition or care  Medicines:   · Medicine  such as a laxative may help relax and loosen your intestines to help you have a bowel movement  Your provider may recommend you only use laxatives for a short time  Long-term use may make your bowels dependent on the medicine  · Take your medicine as directed  Contact your healthcare provider if you think your medicine is not helping or if you have side effects  Tell him of her if you are allergic to any medicine  Keep a list of the medicines, vitamins, and herbs you take  Include the amounts, and when and why you take them  Bring the list or the pill bottles to follow-up visits  Carry your medicine list with you in case of an emergency  Relieve constipation:   · A suppository  may be used to help soften your bowel movements  This may make them easier to pass  A suppository is guided into your rectum through your anus  · An enema  is liquid medicine used to clear bowel movement from your rectum  The medicine is put into your rectum through your anus  Prevent constipation:   · Drink liquids as directed  You may need to drink extra liquids to help soften and move your bowels  Ask how much liquid to drink each day and which liquids are best for you  · Eat high-fiber foods  This may help decrease constipation by adding bulk to your bowel movements  High-fiber foods include fruit, vegetables, whole-grain breads and cereals, and beans  Your healthcare provider or dietitian can help you create a high-fiber meal plan   Your provider may also recommend a fiber supplement if you cannot get enough fiber from food  · Exercise regularly  Regular physical activity can help stimulate your intestines  Walking is a good exercise to prevent or relieve constipation  Ask which exercises are best for you  · Schedule a time each day to have a bowel movement  This may help train your body to have regular bowel movements  Bend forward while you are on the toilet to help move the bowel movement out  Sit on the toilet for at least 10 minutes, even if you do not have a bowel movement  · Talk to your healthcare provider about your medicines  Certain medicines, such as opioids, can cause constipation  Your provider may be able to make medicine changes  For example, he or she may change the kind of medicine, or change when you take it  Follow up with your healthcare provider as directed:  Write down your questions so you remember to ask them during your visits  © Copyright [address] Information is for End User's use only and may not be sold, redistributed or otherwise used for commercial purposes  All illustrations and images included in CareNotes® are the copyrighted property of [employer]  or [facility] Kathryn Sue   The above information is an  only  It is not intended as medical advice for individual conditions or treatments  Talk to your doctor, nurse or pharmacist before following any medical regimen to see if it is safe and effective for you

## 2021-05-05 NOTE — LETTER
May 5, 2021     Garland King, [address] [facility]  [address] [id number]    Patient: Malik Fields   YOB: 1950   Date of Visit: 5/5/2021       Dear Dr Eliza Taylor: Thank you for referring Malik Fields to me for evaluation  Below are my notes for this consultation  If you have questions, please do not hesitate to call me  I look forward to following your patient along with you  Sincerely,        Yasmin Storm PA-C        CC: No Recipients  Shivani Randhawa  5/5/2021  2:23 PM  Sign when Signing Visit  [facility] - Outpatient Follow-up Note  Malik Fields 73 y o  female MRN: [id number]  Encounter: [id number]          ASSESSMENT AND PLAN:      1  Constipation, unspecified constipation type  2  Abdominal bloating  3  Gastroesophageal reflux disease without esophagitis  -Will continue Linzess 72 micro g daily     -Patient will continue Gas-X as needed     -Patient will continue 10 you to try to work on weight loss     -We have had a long discussion about eating 6 small meals a day     -Limiting her carbohydrates and sugars  ______________________________________________________________________    SUBJECTIVE:     62-year-old female who presents for follow-up of abdominal gas, constipation, GERD  Patient reports she is feeling quite well  She reports that the Linzess 72 micro g has really helped her constipation symptoms  She reports her abdominal bloating as well as discomfort is also improved  She reports she had an episode of fluttering in her chest which she believes is related to gas  Patient reports she takes Gas-X for the symptom and it helps  She denies any alarm symptoms     Her biggest concern right now that she is gaining weight  We had a long discussion about dietary modifications  REVIEW OF SYSTEMS IS OTHERWISE NEGATIVE        Historical Information   Past Medical History:   Diagnosis Date    Cancer [facility])     Colon polyp     Deviated septum     Diverticulosis     Endometrial cancer (HCC)     [place] filter in place     Hx of abdominoplasty     Hypoglycemia      Past Surgical History:   Procedure Laterality Date    ABDOMINOPLASTY      CHOLECYSTECTOMY      COLONOSCOPY      GASTRIC BYPASS       Social History   Social History     Substance and Sexual Activity   Alcohol Use Not Currently     Social History     Substance and Sexual Activity   Drug Use Never     Social History     Tobacco Use   Smoking Status Never Smoker   Smokeless Tobacco Never Used     Family History   Problem Relation Age of Onset    Cancer Mother     Heart disease Mother     Breast cancer additional onset Mother     Kidney disease Father     Hypertension Father        Meds/Allergies       Current Outpatient Medications:     ascorbic acid (VITAMIN C) 250 MG tablet    aspirin 81 MG tablet    Biotin 1 MG CAPS    Biotin 1 MG CAPS    Calcium Carbonate-Vitamin D3 600-400 MG-UNIT TABS    Cholecalciferol 50 MCG (2000 UT) CAPS    cyanocobalamin 1,000 mcg/mL    fluticasone (FLONASE) 50 mcg/act nasal spray    furosemide (LASIX) 20 mg tablet    KLOR-CON M10 10 MEQ tablet    Linzess 72 MCG CAPS    Mirabegron ER (Myrbetriq) 50 MG TB24    multivitamin (THERAGRAN) TABS    Omega-3 Fatty Acids (FISH OIL) 1,000 mg    pantoprazole (PROTONIX) 40 mg tablet    Probiotic Product (PROBIOTIC-10 PO)    Thiamine HCl (VITAMIN B-1 PO)    No Known Allergies        Objective     Blood pressure 130/82, pulse 80, height 5' (1 524 m), weight 108 kg (238 lb)  Body mass index is 46 48 kg/m²  PHYSICAL EXAM:      General Appearance:   Alert, cooperative, no distress   HEENT:   Normocephalic, atraumatic, anicteric      Neck:  Supple, symmetrical, trachea midline   Lungs:   Clear to auscultation bilaterally; no rales, rhonchi or wheezing; respirations unlabored    Heart[de-identified]   Regular rate and rhythm; no murmur, rub, or gallop     Abdomen:   Soft, non-tender, non-distended; normal bowel sounds; no masses, no organomegaly    Genitalia:   Deferred    Rectal:   Deferred    Extremities:  No cyanosis, clubbing or edema    Pulses:  2+ and symmetric    Skin:  No jaundice, rashes, or lesions    Lymph nodes:  No palpable cervical lymphadenopathy        Lab Results:   No visits with results within 1 Day(s) from this visit  Latest known visit with results is:   Hospital Outpatient Visit on 03/09/2020   Component Date Value    Case Report 03/09/2020                      Value:Surgical Pathology Report                         Case: [id number]                                   Authorizing Provider:  Colin Bsutamante DO          Collected:           03/09/2020 1312              Ordering Location:      [facility]       Received:            03/09/2020 [address] [facility] Endoscopy                                                             Pathologist:           Сергей Cadet MD                                                               Specimen:    Colon, random ascending/sigmoid                                                            Final Diagnosis 03/09/2020                      Value: This result contains rich text formatting which cannot be displayed here   Additional Information 03/09/2020                      Value: This result contains rich text formatting which cannot be displayed here  [employer] Gross Description 03/09/2020                      Value: This result contains rich text formatting which cannot be displayed here   Clinical Information 03/09/2020                      Value:Random bx r/o microscopic colitis         Radiology Results:   No results found

## 2021-08-05 ENCOUNTER — OFFICE VISIT (OUTPATIENT)
Dept: GASTROENTEROLOGY | Facility: CLINIC | Age: 71
End: 2021-08-05
Payer: MEDICARE

## 2021-08-05 VITALS
DIASTOLIC BLOOD PRESSURE: 82 MMHG | HEART RATE: 72 BPM | HEIGHT: 62 IN | BODY MASS INDEX: 43.06 KG/M2 | SYSTOLIC BLOOD PRESSURE: 126 MMHG | WEIGHT: 234 LBS

## 2021-08-05 DIAGNOSIS — K21.9 GASTROESOPHAGEAL REFLUX DISEASE WITHOUT ESOPHAGITIS: ICD-10-CM

## 2021-08-05 DIAGNOSIS — K59.00 CONSTIPATION, UNSPECIFIED CONSTIPATION TYPE: Primary | ICD-10-CM

## 2021-08-05 DIAGNOSIS — R14.0 ABDOMINAL BLOATING: ICD-10-CM

## 2021-08-05 PROCEDURE — 99213 OFFICE O/P EST LOW 20 MIN: CPT | Performed by: PHYSICIAN ASSISTANT

## 2021-08-05 RX ORDER — POTASSIUM CHLORIDE 750 MG/1
TABLET, FILM COATED, EXTENDED RELEASE ORAL
COMMUNITY

## 2021-08-05 RX ORDER — SOLIFENACIN SUCCINATE 5 MG/1
5 TABLET, FILM COATED ORAL DAILY
COMMUNITY
Start: 2021-07-07

## 2021-08-05 NOTE — LETTER
August 5, 2021     Mary Oliveira, [address] [address]    Patient: Rebecca Gonzalez   YOB: 1950   Date of Visit: 8/5/2021       Dear Dr Manoj Medellin: Thank you for referring Rebecca Gonzalez to me for evaluation  Below are my notes for this consultation  If you have questions, please do not hesitate to call me  I look forward to following your patient along with you  Sincerely,        Radha Guallpa PA-C        CC: No Recipients  Margaret Manzo  8/5/2021  1:22 PM  Sign when Signing Visit  [facility] - Outpatient Follow-up Note  Rebecca Gonzalez 73 y o  female MRN: [id number]  Encounter: [id number]          ASSESSMENT AND PLAN:      1  Constipation, unspecified constipation type  2  Abdominal bloating  3  Gastroesophageal reflux disease without esophagitis  -Will continue Protonix therapy     -Will continue Linzess for constipation     -Patient will utilize Gas-X as needed     -Patient will continue to monitor dietary modifications  ______________________________________________________________________    SUBJECTIVE:    68-year-old female who presents to the office today for follow-up of abdominal bloating, constipation, GERD  Patient is currently maintained on Linzess therapy as well as Gas-X and Protonix and reports that her symptoms are under good control at this time  Patient denies any breakthrough or alarm symptoms  Patient is up-to-date with endoscopic evaluation  Patient denies any alarm symptoms  REVIEW OF SYSTEMS IS OTHERWISE NEGATIVE        Historical Information   Past Medical History:   Diagnosis Date    Cancer [facility])     Colon polyp     Deviated septum     Diverticulosis     Endometrial cancer ([facility] [address] )     [place] filter in place     Hx of abdominoplasty     Hypoglycemia      Past Surgical History:   Procedure Laterality Date    ABDOMINOPLASTY      CHOLECYSTECTOMY      COLONOSCOPY      GASTRIC BYPASS       Social History   Social History     Substance and Sexual Activity   Alcohol Use Not Currently     Social History     Substance and Sexual Activity   Drug Use Never     Social History     Tobacco Use   Smoking Status Never Smoker   Smokeless Tobacco Never Used     Family History   Problem Relation Age of Onset    Cancer Mother     Heart disease Mother     Breast cancer additional onset Mother     Kidney disease Father     Hypertension Father        Meds/Allergies       Current Outpatient Medications:     ascorbic acid (VITAMIN C) 250 MG tablet    aspirin 81 MG tablet    Biotin 1 MG CAPS    Biotin 1 MG CAPS    Calcium Carbonate-Vitamin D3 600-400 MG-UNIT TABS    Cholecalciferol 50 MCG (2000 UT) CAPS    cyanocobalamin 1,000 mcg/mL    fluticasone (FLONASE) 50 mcg/act nasal spray    furosemide (LASIX) 20 mg tablet    KLOR-CON M10 10 MEQ tablet    Linzess 72 MCG CAPS    Mirabegron ER (Myrbetriq) 50 MG TB24    multivitamin (THERAGRAN) TABS    Omega-3 Fatty Acids (FISH OIL) 1,000 mg    pantoprazole (PROTONIX) 40 mg tablet    potassium chloride (Klor-Con 10) 10 mEq tablet    Probiotic Product (PROBIOTIC-10 PO)    solifenacin (VESICARE) 5 mg tablet    Thiamine HCl (VITAMIN B-1 PO)    No Known Allergies        Objective     Blood pressure 126/82, pulse 72, height 5' 1 5" (1 562 m), weight 106 kg (234 lb)  Body mass index is 43 5 kg/m²  PHYSICAL EXAM:      General Appearance:   Alert, cooperative, no distress   HEENT:   Normocephalic, atraumatic, anicteric      Neck:  Supple, symmetrical, trachea midline   Lungs:   Clear to auscultation bilaterally; no rales, rhonchi or wheezing; respirations unlabored    Heart[de-identified]   Regular rate and rhythm; no murmur, rub, or gallop     Abdomen:   Soft, non-tender, non-distended; normal bowel sounds; no masses, no organomegaly    Genitalia:   Deferred    Rectal:   Deferred    Extremities:  No cyanosis, clubbing or edema    Pulses:  2+ and symmetric  Skin:  No jaundice, rashes, or lesions    Lymph nodes:  No palpable cervical lymphadenopathy        Lab Results:   No visits with results within 1 Day(s) from this visit  Latest known visit with results is:   Hospital Outpatient Visit on 03/09/2020   Component Date Value    Case Report 03/09/2020                      Value:Surgical Pathology Report                         Case: [id number]                                   Authorizing Provider:  Finn Reveles DO          Collected:           03/09/2020 1312              Ordering Location:      [facility]       Received:            03/09/2020 [address] [place] [address] Endoscopy                                                             Pathologist:           Rob Waters MD                                                               Specimen:    Colon, random ascending/sigmoid                                                            Final Diagnosis 03/09/2020                      Value: This result contains rich text formatting which cannot be displayed here   Additional Information 03/09/2020                      Value: This result contains rich text formatting which cannot be displayed here  [facility] Description 03/09/2020                      Value: This result contains rich text formatting which cannot be displayed here   Clinical Information 03/09/2020                      Value:Random bx r/o microscopic colitis         Radiology Results:   No results found

## 2021-08-05 NOTE — PROGRESS NOTES
[facility] - Outpatient Follow-up Note  Jaja Dhillon 73 y o  female MRN: [id number]  Encounter: [id number]          ASSESSMENT AND PLAN:      1  Constipation, unspecified constipation type  2  Abdominal bloating  3  Gastroesophageal reflux disease without esophagitis  -Will continue Protonix therapy     -Will continue Linzess for constipation     -Patient will utilize Gas-X as needed     -Patient will continue to monitor dietary modifications  ______________________________________________________________________    SUBJECTIVE:    79-year-old female who presents to the office today for follow-up of abdominal bloating, constipation, GERD  Patient is currently maintained on Linzess therapy as well as Gas-X and Protonix and reports that her symptoms are under good control at this time  Patient denies any breakthrough or alarm symptoms  Patient is up-to-date with endoscopic evaluation  Patient denies any alarm symptoms  REVIEW OF SYSTEMS IS OTHERWISE NEGATIVE        Historical Information   Past Medical History:   Diagnosis Date    [facility] ([facility] )     Colon polyp     Deviated septum     Diverticulosis     Endometrial cancer ([facility] )     [place] filter in place     Hx of abdominoplasty     Hypoglycemia      Past Surgical History:   Procedure Laterality Date    ABDOMINOPLASTY      CHOLECYSTECTOMY      COLONOSCOPY      GASTRIC BYPASS       Social History   Social History     Substance and Sexual Activity   Alcohol Use Not Currently     Social History     Substance and Sexual Activity   Drug Use Never     Social History     Tobacco Use   Smoking Status Never Smoker   Smokeless Tobacco Never Used     Family History   Problem Relation Age of Onset    Cancer Mother     Heart disease Mother     Breast cancer additional onset Mother     Kidney disease Father     Hypertension Father        Meds/Allergies       Current Outpatient Medications:     ascorbic acid (VITAMIN C) 250 MG tablet    aspirin 81 MG tablet    Biotin 1 MG CAPS    Biotin 1 MG CAPS    Calcium Carbonate-Vitamin D3 600-400 MG-UNIT TABS    Cholecalciferol 50 MCG (2000 UT) CAPS    cyanocobalamin 1,000 mcg/mL    fluticasone (FLONASE) 50 mcg/act nasal spray    furosemide (LASIX) 20 mg tablet    KLOR-CON M10 10 MEQ tablet    Linzess 72 MCG CAPS    Mirabegron ER (Myrbetriq) 50 MG TB24    multivitamin (THERAGRAN) TABS    Omega-3 Fatty Acids (FISH OIL) 1,000 mg    pantoprazole (PROTONIX) 40 mg tablet    potassium chloride (Klor-Con 10) 10 mEq tablet    Probiotic Product (PROBIOTIC-10 PO)    solifenacin (VESICARE) 5 mg tablet    Thiamine HCl (VITAMIN B-1 PO)    No Known Allergies        Objective     Blood pressure 126/82, pulse 72, height 5' 1 5" (1 562 m), weight 106 kg (234 lb)  Body mass index is 43 5 kg/m²  PHYSICAL EXAM:      General Appearance:   Alert, cooperative, no distress   HEENT:   Normocephalic, atraumatic, anicteric      Neck:  Supple, symmetrical, trachea midline   Lungs:   Clear to auscultation bilaterally; no rales, rhonchi or wheezing; respirations unlabored    Heart[de-identified]   Regular rate and rhythm; no murmur, rub, or gallop  Abdomen:   Soft, non-tender, non-distended; normal bowel sounds; no masses, no organomegaly    Genitalia:   Deferred    Rectal:   Deferred    Extremities:  No cyanosis, clubbing or edema    Pulses:  2+ and symmetric    Skin:  No jaundice, rashes, or lesions    Lymph nodes:  No palpable cervical lymphadenopathy        Lab Results:   No visits with results within 1 Day(s) from this visit     Latest known visit with results is:   Hospital Outpatient Visit on 03/09/2020   Component Date Value    Case Report 03/09/2020                      Value:Surgical Pathology Report                         Case: [id number]                                   Authorizing Provider:  Jossie Prado DO          Collected:           03/09/2020 1312              [facility]       Received:            03/09/2020 [address] [facility] Endoscopy                                                             Pathologist:           Prabhakar Alvarez MD                                                               Specimen:    Colon, random ascending/sigmoid                                                            Final Diagnosis 03/09/2020                      Value: This result contains rich text formatting which cannot be displayed here   Additional Information 03/09/2020                      Value: This result contains rich text formatting which cannot be displayed here  [facility] Description 03/09/2020                      Value: This result contains rich text formatting which cannot be displayed here   Clinical Information 03/09/2020                      Value:Random bx r/o microscopic colitis         Radiology Results:   No results found

## 2021-08-05 NOTE — PATIENT INSTRUCTIONS
Gas and Bloating   WHAT YOU NEED TO KNOW:   Gas forms inside your body when you eat certain foods or swallow too much air  Bloating is the tight, full feeling you get from too much gas  DISCHARGE INSTRUCTIONS:   Medicines:   · Gas relief medicines: These may help decrease gas pain and bloating  These can be bought without a doctor's order  · Take your medicine as directed  Contact your healthcare provider if you think your medicine is not helping or if you have side effects  Tell him or her if you are allergic to any medicine  Keep a list of the medicines, vitamins, and herbs you take  Include the amounts, and when and why you take them  Bring the list or the pill bottles to follow-up visits  Carry your medicine list with you in case of an emergency  How to manage gas and bloating:   · Keep a log:  Write down what you eat and drink and how often you pass gas each day  · Eat and drink slowly:  Choose foods that do not cause gas, such as meat, poultry, fish, and eggs  Avoid high-fat foods and vegetables or starches that can cause gas  Do not drink carbonated drinks  Add foods back into your diet one at a time after about a week  If the food causes symptoms, avoid it  · Exercise:  Exercise can help relieve gas  · Do not smoke or chew gum: This can cause you to swallow air  · Make sure your dentures fit properly:  Have your dentures fixed if they are loose  Loose dentures can cause you to swallow too much air  Follow up with your healthcare provider as directed:  Write down your questions so you remember to ask them during your visits  Contact your healthcare provider if:   · You have a fever  · You vomit or have diarrhea  · You lose weight without trying  · You have questions or concerns about your condition or care  Return to the emergency department if:   · You have severe abdominal pain  · You have blood in your bowel movement      © [address] Information is for End User's use only and may not be sold, redistributed or otherwise used for commercial purposes  All illustrations and images included in CareNotes® are the copyrighted property of [employer] A M [employer] Inc  or [employer]  The above information is an  only  It is not intended as medical advice for individual conditions or treatments  Talk to your doctor, nurse or pharmacist before following any medical regimen to see if it is safe and effective for you

## 2021-08-06 DIAGNOSIS — K59.00 CONSTIPATION, UNSPECIFIED: ICD-10-CM

## 2021-08-06 RX ORDER — LINACLOTIDE 72 UG/1
1 CAPSULE, GELATIN COATED ORAL DAILY
Qty: 30 CAPSULE | Refills: 3 | Status: SHIPPED | OUTPATIENT
Start: 2021-08-06 | End: 2022-02-05

## 2021-10-04 DIAGNOSIS — K21.9 GASTROESOPHAGEAL REFLUX DISEASE WITHOUT ESOPHAGITIS: ICD-10-CM

## 2021-10-05 RX ORDER — PANTOPRAZOLE SODIUM 40 MG/1
TABLET, DELAYED RELEASE ORAL
Qty: 180 TABLET | Refills: 1 | Status: SHIPPED | OUTPATIENT
Start: 2021-10-05 | End: 2022-04-18

## 2021-11-16 ENCOUNTER — TELEPHONE (OUTPATIENT)
Dept: GASTROENTEROLOGY | Facility: CLINIC | Age: 71
End: 2021-11-16

## 2021-11-30 ENCOUNTER — TELEPHONE (OUTPATIENT)
Dept: GASTROENTEROLOGY | Facility: CLINIC | Age: 71
End: 2021-11-30

## 2021-12-02 ENCOUNTER — OFFICE VISIT (OUTPATIENT)
Dept: GASTROENTEROLOGY | Facility: CLINIC | Age: 71
End: 2021-12-02
Payer: MEDICARE

## 2021-12-02 VITALS
DIASTOLIC BLOOD PRESSURE: 80 MMHG | OXYGEN SATURATION: 97 % | HEART RATE: 65 BPM | SYSTOLIC BLOOD PRESSURE: 142 MMHG | BODY MASS INDEX: 43.98 KG/M2 | WEIGHT: 239 LBS | HEIGHT: 62 IN

## 2021-12-02 DIAGNOSIS — K59.00 CONSTIPATION, UNSPECIFIED CONSTIPATION TYPE: ICD-10-CM

## 2021-12-02 DIAGNOSIS — K21.9 GASTROESOPHAGEAL REFLUX DISEASE WITHOUT ESOPHAGITIS: Primary | ICD-10-CM

## 2021-12-02 PROCEDURE — 99213 OFFICE O/P EST LOW 20 MIN: CPT | Performed by: PHYSICIAN ASSISTANT

## 2022-02-05 DIAGNOSIS — K59.00 CONSTIPATION, UNSPECIFIED: ICD-10-CM

## 2022-02-05 RX ORDER — LINACLOTIDE 72 UG/1
1 CAPSULE, GELATIN COATED ORAL DAILY
Qty: 30 CAPSULE | Refills: 3 | Status: SHIPPED | OUTPATIENT
Start: 2022-02-05

## 2022-04-08 ENCOUNTER — TELEPHONE (OUTPATIENT)
Dept: GASTROENTEROLOGY | Facility: CLINIC | Age: 72
End: 2022-04-08

## 2022-04-08 NOTE — TELEPHONE ENCOUNTER
Jhon Chopra pt-  Patient needs to know the status of the prior authorization for her linzess? She has no medication      Samples maybe?   Please phone [phone] to advise

## 2022-04-18 DIAGNOSIS — K21.9 GASTROESOPHAGEAL REFLUX DISEASE WITHOUT ESOPHAGITIS: ICD-10-CM

## 2022-04-18 RX ORDER — PANTOPRAZOLE SODIUM 40 MG/1
TABLET, DELAYED RELEASE ORAL
Qty: 180 TABLET | Refills: 0 | Status: SHIPPED | OUTPATIENT
Start: 2022-04-18 | End: 2022-04-18

## 2022-04-18 RX ORDER — PANTOPRAZOLE SODIUM 40 MG/1
40 TABLET, DELAYED RELEASE ORAL 2 TIMES DAILY
Qty: 180 TABLET | Refills: 0 | Status: SHIPPED | OUTPATIENT
Start: 2022-04-18

## 2022-05-04 ENCOUNTER — OFFICE VISIT (OUTPATIENT)
Dept: GASTROENTEROLOGY | Facility: CLINIC | Age: 72
End: 2022-05-04
Payer: MEDICARE

## 2022-05-04 VITALS
HEIGHT: 62 IN | DIASTOLIC BLOOD PRESSURE: 90 MMHG | WEIGHT: 228 LBS | HEART RATE: 58 BPM | SYSTOLIC BLOOD PRESSURE: 148 MMHG | BODY MASS INDEX: 41.96 KG/M2 | OXYGEN SATURATION: 98 %

## 2022-05-04 DIAGNOSIS — K59.00 CONSTIPATION, UNSPECIFIED CONSTIPATION TYPE: ICD-10-CM

## 2022-05-04 DIAGNOSIS — K21.9 GASTROESOPHAGEAL REFLUX DISEASE WITHOUT ESOPHAGITIS: Primary | ICD-10-CM

## 2022-05-04 PROCEDURE — 99213 OFFICE O/P EST LOW 20 MIN: CPT | Performed by: PHYSICIAN ASSISTANT

## 2022-05-04 NOTE — LETTER
May 4, 2022     Anna Flores, [address] [facility]  [address] [place] [address] [address] [address]    Patient: Alvin Crowder   YOB: 1950   Date of Visit: 5/4/2022       Dear Dr Jose Paredes: Thank you for referring Alvin Crowder to me for evaluation  Below are my notes for this consultation  If you have questions, please do not hesitate to call me  I look forward to following your patient along with you  Sincerely,        Susana Campos PA-C        CC: No Recipients  Susana Campos [place]  5/4/2022 11:39 AM  Sign when Signing Visit  [facility] - Outpatient Follow-up Note  Alvin Crowder 81 y o  female MRN: [id number]  Encounter: [id number]          ASSESSMENT AND PLAN:      1  Gastroesophageal reflux disease without esophagitis  2  Constipation, unspecified constipation type  -Will plan to continue PPI BID  -Will plan to continue Linzess PRN  -No plans for repeat GI procedures at this time      -Follow up in 4 months    ______________________________________________________________________    SUBJECTIVE:    81-year-old female presents the office today for GI follow-up  Patient suffers from chronic constipation and gastroesophageal reflux disease  Patient reports that overall she is doing well  She has actually lost 11 lb since her last appointment  She reports that her reflux has been well controlled on pantoprazole 40 mg twice a day  She reports that she does not even have to take the Linzess every day because she is having 1 large bowel movement every morning  She denies any alarm symptoms  She is going to be going in for a total knee replacement on her left knee the beginning of June  Patient denies any severe abdominal pain  Patient denies any melena or rectal bleeding  REVIEW OF SYSTEMS IS OTHERWISE NEGATIVE        Historical Information   Past Medical History:   Diagnosis Date    Cancer [facility])     Colon polyp     Deviated septum     Diverticulosis     [facility]omet[facility] [address] [facility]     Michelle filter in place     Hx of abdominoplasty     Hypoglycemia      Past Surgical History:   Procedure Laterality Date    ABDOMINOPLASTY      CHOLECYSTECTOMY      COLONOSCOPY      GASTRIC BYPASS       Social History   Social History     Substance and Sexual Activity   Alcohol Use Not Currently     Social History     Substance and Sexual Activity   Drug Use Never     Social History     Tobacco Use   Smoking Status Never Smoker   Smokeless Tobacco Never Used     Family History   Problem Relation Age of Onset    Cancer Mother     Heart disease Mother     Breast cancer additional onset Mother     Kidney disease Father     Hypertension Father        Meds/Allergies       Current Outpatient Medications:     ascorbic acid (VITAMIN C) 250 MG tablet    aspirin 81 MG tablet    Biotin 1 MG CAPS    Calcium Carbonate-Vitamin D3 600-400 MG-UNIT TABS    Cholecalciferol 50 MCG (2000 UT) CAPS    cyanocobalamin 1,000 mcg/mL    fluticasone (FLONASE) 50 mcg/act nasal spray    furosemide (LASIX) 20 mg tablet    KLOR-CON M10 10 MEQ tablet    Linzess 72 MCG CAPS    multivitamin (THERAGRAN) TABS    Omega-3 Fatty Acids (FISH OIL) 1,000 mg    pantoprazole (PROTONIX) 40 mg tablet    potassium chloride (Klor-Con 10) 10 mEq tablet    Probiotic Product (PROBIOTIC-10 PO)    solifenacin (VESICARE) 5 mg tablet    Thiamine HCl (VITAMIN B-1 PO)    Mirabegron ER (Myrbetriq) 50 MG TB24    No Known Allergies        Objective     Blood pressure 148/90, pulse 58, height 5' 1 5" (1 562 m), weight 103 kg (228 lb), SpO2 98 %  Body mass index is 42 38 kg/m²        PHYSICAL EXAM:      General Appearance:   Alert, cooperative, no distress   HEENT:   Normocephalic, atraumatic, anicteric      Neck:  Supple, symmetrical, trachea midline   Lungs:   Clear to auscultation bilaterally; no rales, rhonchi or wheezing; respirations unlabored    Heart[de-identified]   Regular rate and rhythm; no murmur, rub, or gallop  Abdomen:   Soft, non-tender, non-distended; normal bowel sounds; no masses, no organomegaly    Genitalia:   Deferred    Rectal:   Deferred    Extremities:  No cyanosis, clubbing or edema    Pulses:  2+ and symmetric    Skin:  No jaundice, rashes, or lesions    Lymph nodes:  No palpable cervical lymphadenopathy        Lab Results:   No visits with results within 1 Day(s) from this visit  Latest known visit with results is:   Hospital Outpatient Visit on 03/09/2020   Component Date Value    Case Report 03/09/2020                      Value:Surgical Pathology Report                         Case: [id number]                                   Authorizing Provider:  Linda Grimes DO          Collected:           03/09/2020 1312              [facility] Location[facility]       Received:            03/09/2020 [address] [place] [address] Endoscopy                                                             Pathologist:           Manuel Murphy MD                                                               Specimen:    Colon, random ascending/sigmoid                                                            Final Diagnosis 03/09/2020                      Value: This result contains rich text formatting which cannot be displayed here   Additional Information 03/09/2020                      Value: This result contains rich text formatting which cannot be displayed here  Paradise Perrin Gross Description 03/09/2020                      Value: This result contains rich text formatting which cannot be displayed here   Clinical Information 03/09/2020                      Value:Random bx r/o microscopic colitis         Radiology Results:   No results found    Answers for HPI/ROS submitted by the patient on 4/27/2022  Progression since onset: resolved  anorexia: No  arthralgias: No  belching: No  constipation: Yes  diarrhea: No  dysuria: No  fever: No  flatus: No  frequency: No  headaches: No  hematochezia: No  hematuria: No  melena: No  myalgias: No  nausea:  No  weight loss: No  vomiting: No

## 2022-05-04 NOTE — PROGRESS NOTES
[facility] - Outpatient Follow-up Note  Claude Lares 81 y o  female MRN: [id number]  Encounter: [id number]          ASSESSMENT AND PLAN:      1  Gastroesophageal reflux disease without esophagitis  2  Constipation, unspecified constipation type  -Will plan to continue PPI BID  -Will plan to continue Linzess PRN  -No plans for repeat GI procedures at this time      -Follow up in 4 months    ______________________________________________________________________    SUBJECTIVE:    72-year-old female presents the office today for GI follow-up  Patient suffers from chronic constipation and gastroesophageal reflux disease  Patient reports that overall she is doing well  She has actually lost 11 lb since her last appointment  She reports that her reflux has been well controlled on pantoprazole 40 mg twice a day  She reports that she does not even have to take the Linzess every day because she is having 1 large bowel movement every morning  She denies any alarm symptoms  She is going to be going in for a total knee replacement on her left knee the beginning of June  Patient denies any severe abdominal pain  Patient denies any melena or rectal bleeding  REVIEW OF SYSTEMS IS OTHERWISE NEGATIVE        Historical Information   Past Medical History:   Diagnosis Date    Cancer [facility])     Colon polyp     Deviated septum     Diverticulosis     [facility]     Michelle filter in place     Hx of abdominoplasty     Hypoglycemia      Past Surgical History:   Procedure Laterality Date    ABDOMINOPLASTY      CHOLECYSTECTOMY      COLONOSCOPY      GASTRIC BYPASS       Social History   Social History     Substance and Sexual Activity   Alcohol Use Not Currently     Social History     Substance and Sexual Activity   Drug Use Never     Social History     Tobacco Use   Smoking Status Never Smoker   Smokeless Tobacco Never Used     Family History   Problem Relation Age of Onset    Cancer Mother     Heart disease Mother     Breast cancer additional onset Mother     Kidney disease Father     Hypertension Father        Meds/Allergies       Current Outpatient Medications:     ascorbic acid (VITAMIN C) 250 MG tablet    aspirin 81 MG tablet    Biotin 1 MG CAPS    Calcium Carbonate-Vitamin D3 600-400 MG-UNIT TABS    Cholecalciferol 50 MCG (2000 UT) CAPS    cyanocobalamin 1,000 mcg/mL    fluticasone (FLONASE) 50 mcg/act nasal spray    furosemide (LASIX) 20 mg tablet    KLOR-CON M10 10 MEQ tablet    Linzess 72 MCG CAPS    multivitamin (THERAGRAN) TABS    Omega-3 Fatty Acids (FISH OIL) 1,000 mg    pantoprazole (PROTONIX) 40 mg tablet    potassium chloride (Klor-Con 10) 10 mEq tablet    Probiotic Product (PROBIOTIC-10 PO)    solifenacin (VESICARE) 5 mg tablet    Thiamine HCl (VITAMIN B-1 PO)    Mirabegron ER (Myrbetriq) 50 MG TB24    No Known Allergies        Objective     Blood pressure 148/90, pulse 58, height 5' 1 5" (1 562 m), weight 103 kg (228 lb), SpO2 98 %  Body mass index is 42 38 kg/m²  PHYSICAL EXAM:      General Appearance:   Alert, cooperative, no distress   HEENT:   Normocephalic, atraumatic, anicteric      Neck:  Supple, symmetrical, trachea midline   Lungs:   Clear to auscultation bilaterally; no rales, rhonchi or wheezing; respirations unlabored    Heart[de-identified]   Regular rate and rhythm; no murmur, rub, or gallop  Abdomen:   Soft, non-tender, non-distended; normal bowel sounds; no masses, no organomegaly    Genitalia:   Deferred    Rectal:   Deferred    Extremities:  No cyanosis, clubbing or edema    Pulses:  2+ and symmetric    Skin:  No jaundice, rashes, or lesions    Lymph nodes:  No palpable cervical lymphadenopathy        Lab Results:   No visits with results within 1 Day(s) from this visit     Latest known visit with results is:   Hospital Outpatient Visit on 03/09/2020   Component Date Value    Case Report 03/09/2020 Value:Surgical Pathology Report                         Case: [id number]                                   Authorizing Provider:  Neto Enrique DO          Collected:           03/09/2020 1312              Order[facility] Location[facility]      [facility]       Received:            03/09/2020 [facility] Endoscopy                                                             Pathologist:           Jarrod Herman MD                                                               Specimen:    Colon, random ascending/sigmoid                                                            Final Diagnosis 03/09/2020                      Value: This result contains rich text formatting which cannot be displayed here   Additional Information 03/09/2020                      Value: This result contains rich text formatting which cannot be displayed here  Yesenia Rebel Gross Description 03/09/2020                      Value: This result contains rich text formatting which cannot be displayed here   Clinical Information 03/09/2020                      Value:Random bx r/o microscopic colitis         Radiology Results:   No results found  Answers for HPI/ROS submitted by the patient on 4/27/2022  Progression since onset: resolved  anorexia: No  arthralgias: No  belching: No  constipation: Yes  diarrhea: No  dysuria: No  fever: No  flatus: No  frequency: No  headaches: No  hematochezia: No  hematuria: No  melena: No  myalgias: No  nausea:  No  weight loss: No  vomiting: No

## 2022-05-04 NOTE — PATIENT INSTRUCTIONS
GERD (Gastroesophageal Reflux Disease)   WHAT YOU NEED TO KNOW:   Gastroesophageal reflux disease (GERD) is reflux that happens more than 2 times a week for a few weeks  Reflux means acid and food in your stomach back up into your esophagus  GERD can cause other health problems over time if it is not treated  DISCHARGE INSTRUCTIONS:   Call your local emergency number (911 in the [address] [facility] [facility]) if:   · You have severe chest pain and sudden trouble breathing  Return to the emergency department if:   · You have trouble breathing after you vomit  · You have trouble swallowing, or pain with swallowing  · Your bowel movements are black, bloody, or tarry-looking  · Your vomit looks like coffee grounds or has blood in it  Call your doctor or gastroenterologist if:   · You feel full and cannot burp or vomit  · You vomit large amounts, or you vomit often  · You are losing weight without trying  · Your symptoms get worse or do not improve with treatment  · You have questions or concerns about your condition or care  Medicines:   · Medicines  are used to decrease stomach acid  Medicine may also be used to help your lower esophageal sphincter and stomach contract (tighten) more  · Take your medicine as directed  Contact your healthcare provider if you think your medicine is not helping or if you have side effects  Tell him of her if you are allergic to any medicine  Keep a list of the medicines, vitamins, and herbs you take  Include the amounts, and when and why you take them  Bring the list or the pill bottles to follow-up visits  Carry your medicine list with you in case of an emergency  Manage GERD:       · Do not have foods or drinks that may increase heartburn  These include chocolate, peppermint, fried or fatty foods, drinks that contain caffeine, or carbonated drinks (soda)  Other foods include spicy foods, onions, tomatoes, and tomato-based foods   Do not have foods or drinks that can irritate your esophagus, such as citrus fruits, juices, and alcohol  · Do not eat large meals  When you eat a lot of food at one time, your stomach needs more acid to digest it  Eat 6 small meals each day instead of 3 large ones, and eat slowly  Do not eat meals 2 to 3 hours before bedtime  · Elevate the head of your bed  Place 6-inch blocks under the head of your bed frame  You may also use more than one pillow under your head and shoulders while you sleep  · Maintain a healthy weight  If you are overweight, weight loss may help relieve symptoms of GERD  · Do not smoke  Smoking weakens the lower esophageal sphincter and increases the risk of GERD  Ask your healthcare provider for information if you currently smoke and need help to quit  E-cigarettes or smokeless tobacco still contain nicotine  Talk to your healthcare provider before you use these products  · Do not put pressure on your abdomen  Pressure pushes acid up into your esophagus  Do not wear clothing that is tight around your waist  Do not bend over  Bend at the knees if you need to pick something up  Follow up with your doctor or gastroenterologist as directed:  Write down your questions so you remember to ask them during your visits  © Copyright [employer] 2022 Information is for End User's use only and may not be sold, redistributed or otherwise used for commercial purposes  All illustrations and images included in CareNotes® are the copyrighted property of [employer]  or [employer] Valdes  The above information is an  only  It is not intended as medical advice for individual conditions or treatments  Talk to your doctor, nurse or pharmacist before following any medical regimen to see if it is safe and effective for you

## 2022-06-10 ENCOUNTER — TELEPHONE (OUTPATIENT)
Dept: GASTROENTEROLOGY | Facility: CLINIC | Age: 72
End: 2022-06-10

## 2022-06-10 NOTE — TELEPHONE ENCOUNTER
Abad Wright pt-  Patient was asked to give you a call to provide an update     Please phone after 1 [phone]

## 2022-06-13 NOTE — TELEPHONE ENCOUNTER
Patient phoned again      She is expecting a phone call back from Jhon Chopra at some point     Please phone [phone]

## 2022-08-12 DIAGNOSIS — K59.00 CONSTIPATION, UNSPECIFIED: ICD-10-CM

## 2022-08-12 RX ORDER — LINACLOTIDE 72 UG/1
CAPSULE, GELATIN COATED ORAL
Qty: 30 CAPSULE | Refills: 3 | Status: SHIPPED | OUTPATIENT
Start: 2022-08-12

## 2022-10-08 DIAGNOSIS — K21.9 GASTROESOPHAGEAL REFLUX DISEASE WITHOUT ESOPHAGITIS: ICD-10-CM

## 2022-10-08 RX ORDER — PANTOPRAZOLE SODIUM 40 MG/1
TABLET, DELAYED RELEASE ORAL
Qty: 180 TABLET | Refills: 0 | Status: SHIPPED | OUTPATIENT
Start: 2022-10-08

## 2022-10-19 ENCOUNTER — OFFICE VISIT (OUTPATIENT)
Dept: GASTROENTEROLOGY | Facility: CLINIC | Age: 72
End: 2022-10-19
Payer: MEDICARE

## 2022-10-19 VITALS
BODY MASS INDEX: 40.67 KG/M2 | HEART RATE: 68 BPM | HEIGHT: 62 IN | WEIGHT: 221 LBS | SYSTOLIC BLOOD PRESSURE: 158 MMHG | DIASTOLIC BLOOD PRESSURE: 70 MMHG | OXYGEN SATURATION: 99 %

## 2022-10-19 DIAGNOSIS — K59.00 CONSTIPATION, UNSPECIFIED CONSTIPATION TYPE: ICD-10-CM

## 2022-10-19 DIAGNOSIS — K21.9 GASTROESOPHAGEAL REFLUX DISEASE WITHOUT ESOPHAGITIS: Primary | ICD-10-CM

## 2022-10-19 PROCEDURE — 99213 OFFICE O/P EST LOW 20 MIN: CPT | Performed by: PHYSICIAN ASSISTANT

## 2022-10-19 NOTE — LETTER
October 19, 2022     Jarred Ny, [address] [facility]  [address] [address] [address] [address]    Patient: Kit Evans   YOB: 1950   Date of Visit: 10/19/2022       Dear Dr Belinda Oseguera: Thank you for referring Kit Evans to me for evaluation  Below are my notes for this consultation  If you have questions, please do not hesitate to call me  I look forward to following your patient along with you  Sincerely,        Courtney Rolon PA-C        CC: No Recipients  Courtney Rolon, [place]  10/19/2022 11:39 AM  Sign when Signing Visit  [facility] - Outpatient Follow-up Note  Kit Evans 81 y o  female MRN: [id number]  Encounter: [id number]          ASSESSMENT AND PLAN:      1  Gastroesophageal reflux disease without esophagitis  2  Constipation, unspecified constipation type  -Continue Linzess as prescribed  -Continue PPI therapy     -Continue daily exercise     -No plans for any repeat endoscopic evaluation     -Follow-up in 4 months or sooner if symptoms return   ______________________________________________________________________    SUBJECTIVE:    26-year-old female presents the office today with a past medical history significant for GERD and chronic constipation  Patient reports that overall she is doing quite well  She reports she is exercising 3 to 4 times a week  She reports that her gastrointestinal symptoms are under great control  She is currently taking Linzess 72 mcg daily and if she does take this on a consistent basis she is having normal bowel movements  She reports as long as she has a normal bowel movement she does not experience any nausea or vomiting  She reports that her pantoprazole is still controlling her acid reflux  She denies any significant alarm symptoms  She routinely had blood work performed with her primary care doctor that showed that she is B12 deficient  She will be going for B12 injections      Colonoscopy from 3/2020 showed diverticular disease  EGD from 3/2020 was normal      REVIEW OF SYSTEMS IS OTHERWISE NEGATIVE        Historical Information   Past Medical History:   Diagnosis Date   • [facility]    • Colon polyp    • Deviated septum    • Diverticulosis    • Endometrial cancer ([facility] [address] )    • Michelle filter in place    • Hx of abdominoplasty    • Hypoglycemia      Past Surgical History:   Procedure Laterality Date   • ABDOMINOPLASTY     • CHOLECYSTECTOMY     • COLONOSCOPY     • GASTRIC BYPASS       Social History   Social History     Substance and Sexual Activity   Alcohol Use Not Currently     Social History     Substance and Sexual Activity   Drug Use Never     Social History     Tobacco Use   Smoking Status Never Smoker   Smokeless Tobacco Never Used     Family History   Problem Relation Age of Onset   • Cancer Mother    • Heart disease Mother    • Breast cancer additional onset Mother    • Kidney disease Father    • Hypertension Father        Meds/Allergies       Current Outpatient Medications:   •  ascorbic acid (VITAMIN C) 250 MG tablet  •  aspirin 81 MG tablet  •  Biotin 1 MG CAPS  •  Calcium Carbonate-Vitamin D3 600-400 MG-UNIT TABS  •  Cholecalciferol 50 MCG (2000 UT) CAPS  •  cyanocobalamin 1,000 mcg/mL  •  fluticasone (FLONASE) 50 mcg/act nasal spray  •  furosemide (LASIX) 20 mg tablet  •  ibuprofen (MOTRIN) 800 mg tablet  •  KLOR-CON M10 10 MEQ tablet  •  Linzess 72 MCG CAPS  •  multivitamin (THERAGRAN) TABS  •  mupirocin (BACTROBAN) 2 % ointment  •  nitrofurantoin (MACROBID) 100 mg capsule  •  Omega-3 Fatty Acids (FISH OIL) 1,000 mg  •  oxyCODONE (ROXICODONE) 5 immediate release tablet  •  pantoprazole (PROTONIX) 40 mg tablet  •  potassium chloride (Klor-Con) 10 mEq tablet  •  Probiotic Product (PROBIOTIC-10 PO)  •  solifenacin (VESICARE) 5 mg tablet  •  sulfamethoxazole-trimethoprim (BACTRIM DS) 800-160 mg per tablet  •  Thiamine HCl (VITAMIN B-1 PO)  •  Mirabegron ER (Myrbetriq) 50 MG TB24    No Known Allergies        Objective     Blood pressure 158/70, pulse 68, height 5' 1 5" (1 562 m), weight 100 kg (221 lb), SpO2 99 %  Body mass index is 41 08 kg/m²  PHYSICAL EXAM:      General Appearance:   Alert, cooperative, no distress   HEENT:   Normocephalic, atraumatic, anicteric      Neck:  Supple, symmetrical, trachea midline   Lungs:   Clear to auscultation bilaterally; no rales, rhonchi or wheezing; respirations unlabored    Heart[de-identified]   Regular rate and rhythm; no murmur, rub, or gallop  Abdomen:   Soft, non-tender, non-distended; normal bowel sounds; no masses, no organomegaly    Genitalia:   Deferred    Rectal:   Deferred    Extremities:  No cyanosis, clubbing or edema    Pulses:  2+ and symmetric    Skin:  No jaundice, rashes, or lesions    Lymph nodes:  No palpable cervical lymphadenopathy        Lab Results:   No visits with results within 1 Day(s) from this visit  Latest known visit with results is:   Hospital Outpatient Visit on 03/09/2020   Component Date Value   • Case Report 03/09/2020                      Value:Surgical Pathology Report                         Case: [id number]                                   Authorizing Provider:  Tracey Hogan DO          Collected:           03/09/2020 1312              Order[facility] Location[facility]      [facility]       Received:            03/09/2020 [facility] [facility] Endoscopy                                                             Pathologist:           Irma Limon MD                                                               Specimen:    Colon, random ascending/sigmoid                                                           • Final Diagnosis 03/09/2020                      Value: This result contains rich text formatting which cannot be displayed here  • Additional Information 03/09/2020                      Value: This result contains rich text formatting which cannot be displayed here     • Gross Description 03/09/2020                      Value: This result contains rich text formatting which cannot be displayed here  • Clinical Information 03/09/2020                      Value:Random bx r/o microscopic colitis         Radiology Results:   No results found

## 2022-10-19 NOTE — PROGRESS NOTES
[facility] - Outpatient Follow-up Note  Micheal Mack 81 y o  female MRN: [id number]  Encounter: [id number]          ASSESSMENT AND PLAN:      1  Gastroesophageal reflux disease without esophagitis  2  Constipation, unspecified constipation type  -Continue Linzess as prescribed  -Continue PPI therapy     -Continue daily exercise     -No plans for any repeat endoscopic evaluation     -Follow-up in 4 months or sooner if symptoms return   ______________________________________________________________________    SUBJECTIVE:    65-year-old female presents the office today with a past medical history significant for GERD and chronic constipation  Patient reports that overall she is doing quite well  She reports she is exercising 3 to 4 times a week  She reports that her gastrointestinal symptoms are under great control  She is currently taking Linzess 72 mcg daily and if she does take this on a consistent basis she is having normal bowel movements  She reports as long as she has a normal bowel movement she does not experience any nausea or vomiting  She reports that her pantoprazole is still controlling her acid reflux  She denies any significant alarm symptoms  She routinely had blood work performed with her primary care doctor that showed that she is B12 deficient  She will be going for B12 injections  Colonoscopy from 3/2020 showed diverticular disease  EGD from 3/2020 was normal      REVIEW OF SYSTEMS IS OTHERWISE NEGATIVE        Historical Information   Past Medical History:   Diagnosis Date   • Cancer [facility])    • Colon polyp    • Deviated septum    • Diverticulosis    • Endometrial cancer ([facility] [address] )    • [place] filter in place    • Hx of abdominoplasty    • Hypoglycemia      Past Surgical History:   Procedure Laterality Date   • ABDOMINOPLASTY     • CHOLECYSTECTOMY     • COLONOSCOPY     • GASTRIC BYPASS       Social History   Social History     Substance and Sexual Activity Alcohol Use Not Currently     Social History     Substance and Sexual Activity   Drug Use Never     Social History     Tobacco Use   Smoking Status Never Smoker   Smokeless Tobacco Never Used     Family History   Problem Relation Age of Onset   • Cancer Mother    • Heart disease Mother    • Breast cancer additional onset Mother    • Kidney disease Father    • Hypertension Father        Meds/Allergies       Current Outpatient Medications:   •  ascorbic acid (VITAMIN C) 250 MG tablet  •  aspirin 81 MG tablet  •  Biotin 1 MG CAPS  •  Calcium Carbonate-Vitamin D3 600-400 MG-UNIT TABS  •  Cholecalciferol 50 MCG (2000 UT) CAPS  •  cyanocobalamin 1,000 mcg/mL  •  fluticasone (FLONASE) 50 mcg/act nasal spray  •  furosemide (LASIX) 20 mg tablet  •  ibuprofen (MOTRIN) 800 mg tablet  •  KLOR-CON M10 10 MEQ tablet  •  Linzess 72 MCG CAPS  •  multivitamin (THERAGRAN) TABS  •  mupirocin (BACTROBAN) 2 % ointment  •  nitrofurantoin (MACROBID) 100 mg capsule  •  Omega-3 Fatty Acids (FISH OIL) 1,000 mg  •  oxyCODONE (ROXICODONE) 5 immediate release tablet  •  pantoprazole (PROTONIX) 40 mg tablet  •  potassium chloride (Klor-Con) 10 mEq tablet  •  Probiotic Product (PROBIOTIC-10 PO)  •  solifenacin (VESICARE) 5 mg tablet  •  sulfamethoxazole-trimethoprim (BACTRIM DS) 800-160 mg per tablet  •  Thiamine HCl (VITAMIN B-1 PO)  •  Mirabegron ER (Myrbetriq) 50 MG TB24    No Known Allergies        Objective     Blood pressure 158/70, pulse 68, height 5' 1 5" (1 562 m), weight 100 kg (221 lb), SpO2 99 %  Body mass index is 41 08 kg/m²  PHYSICAL EXAM:      General Appearance:   Alert, cooperative, no distress   HEENT:   Normocephalic, atraumatic, anicteric      Neck:  Supple, symmetrical, trachea midline   Lungs:   Clear to auscultation bilaterally; no rales, rhonchi or wheezing; respirations unlabored    Heart[de-identified]   Regular rate and rhythm; no murmur, rub, or gallop     Abdomen:   Soft, non-tender, non-distended; normal bowel sounds; no masses, no organomegaly    Genitalia:   Deferred    Rectal:   Deferred    Extremities:  No cyanosis, clubbing or edema    Pulses:  2+ and symmetric    Skin:  No jaundice, rashes, or lesions    Lymph nodes:  No palpable cervical lymphadenopathy        Lab Results:   No visits with results within 1 Day(s) from this visit  Latest known visit with results is:   Hospital Outpatient Visit on 03/09/2020   Component Date Value   • Case Report 03/09/2020                      Value:Surgical Pathology Report                         Case: [id number]                                   Authorizing Provider:  Jessica Cash DO          Collected:           03/09/2020 1312              Ordering Location[facility]       Received:            03/09/2020 [address] [place] [address] Endoscopy                                                             Pathologist:           Meaghan Jackson MD                                                               Specimen:    Colon, random ascending/sigmoid                                                           • Final Diagnosis 03/09/2020                      Value: This result contains rich text formatting which cannot be displayed here  • Additional Information 03/09/2020                      Value: This result contains rich text formatting which cannot be displayed here  • Gross Description 03/09/2020                      Value: This result contains rich text formatting which cannot be displayed here  • Clinical Information 03/09/2020                      Value:Random bx r/o microscopic colitis         Radiology Results:   No results found

## 2023-02-15 DIAGNOSIS — K21.9 GASTROESOPHAGEAL REFLUX DISEASE WITHOUT ESOPHAGITIS: ICD-10-CM

## 2023-02-15 RX ORDER — PANTOPRAZOLE SODIUM 40 MG/1
TABLET, DELAYED RELEASE ORAL
Qty: 180 TABLET | Refills: 0 | Status: SHIPPED | OUTPATIENT
Start: 2023-02-15

## 2023-02-17 RX ORDER — ECHINACEA PURPUREA EXTRACT 125 MG
1 TABLET ORAL
COMMUNITY
Start: 2023-01-06

## 2023-02-22 ENCOUNTER — OFFICE VISIT (OUTPATIENT)
Dept: GASTROENTEROLOGY | Facility: CLINIC | Age: 73
End: 2023-02-22

## 2023-02-22 VITALS
WEIGHT: 228 LBS | BODY MASS INDEX: 41.96 KG/M2 | OXYGEN SATURATION: 98 % | SYSTOLIC BLOOD PRESSURE: 128 MMHG | DIASTOLIC BLOOD PRESSURE: 80 MMHG | HEART RATE: 84 BPM | HEIGHT: 62 IN

## 2023-02-22 DIAGNOSIS — K21.9 GASTROESOPHAGEAL REFLUX DISEASE WITHOUT ESOPHAGITIS: Primary | ICD-10-CM

## 2023-02-22 DIAGNOSIS — K59.00 CONSTIPATION, UNSPECIFIED CONSTIPATION TYPE: ICD-10-CM

## 2023-02-22 NOTE — LETTER
February 22, 2023     Deareric Zarco PA-C  [address] [facility]  [address] [facility]  [address] [address]    Patient: Charo Jones   YOB: 1950   Date of Visit: 2/22/2023       Dear Dr Alexei Harper: Thank you for referring Charo Jones to me for evaluation  Below are my notes for this consultation  If you have questions, please do not hesitate to call me  I look forward to following your patient along with you  Sincerely,        Nenita Rose PA-C        CC: No Recipients  Nenita Rose PA-C  2/22/2023 12:49 PM  Sign when Signing Visit  [facility] - Outpatient Follow-up Note  Charo Jones 81 y o  female MRN: [id number]  Encounter: [id number]          ASSESSMENT AND PLAN:      1  Gastroesophageal reflux disease without esophagitis  2  Constipation, unspecified constipation type  -Will increase Linzess to 145 mcg daily   -Patient will continue daily exercise  -Dietary modifications to include high-protein   -Plenty of water intake     -No plans for repeat endoscopic evaluation at this time  ______________________________________________________________________    SUBJECTIVE:   26-year-old female with a past medical history significant for GERD and chronic constipation presents to the GI clinic today for follow-up  Overall patient is doing quite well  She is currently on Linzess 72 mcg  She reports that she is still having issues with constipation and incomplete evacuation of stool  She denies any severe abdominal pain  She denies any vomiting but she does report early satiety especially when she is constipated  She has been exercising daily  She reports that she is definitely lost inches and she feels great  Patient's last colonoscopy was performed on March 9, 2020  Patient had evidence of diverticulosis  Patient is due for recall colonoscopy in 2025 due to to a history of colon polyps    EGD from 3/9/2020 showed surgical changes consistent with gastric bypass  REVIEW OF SYSTEMS IS OTHERWISE NEGATIVE        Historical Information   Past Medical History:   Diagnosis Date   • [facility]    • Colon polyp    • Deviated septum    • Diverticulosis    • Endometrial cancer ([facility] [address] )    • [place] filter in place    • Hx of abdominoplasty    • Hypoglycemia      Past Surgical History:   Procedure Laterality Date   • ABDOMINOPLASTY     • CHOLECYSTECTOMY     • COLONOSCOPY     • GASTRIC BYPASS       Social History   Social History     Substance and Sexual Activity   Alcohol Use Not Currently     Social History     Substance and Sexual Activity   Drug Use Never     Social History     Tobacco Use   Smoking Status Never   Smokeless Tobacco Never     Family History   Problem Relation Age of Onset   • Cancer Mother    • Heart disease Mother    • Breast cancer additional onset Mother    • Kidney disease Father    • Hypertension Father        Meds/Allergies        Current Outpatient Medications:   •  ascorbic acid (VITAMIN C) 250 MG tablet  •  aspirin 81 MG tablet  •  Biotin 1 MG CAPS  •  Calcium Carbonate-Vitamin D3 600-400 MG-UNIT TABS  •  Cholecalciferol 50 MCG (2000 UT) CAPS  •  cyanocobalamin 1,000 mcg/mL  •  fluticasone (FLONASE) 50 mcg/act nasal spray  •  furosemide (LASIX) 20 mg tablet  •  ibuprofen (MOTRIN) 800 mg tablet  •  KLOR-CON M10 10 MEQ tablet  •  Linzess 72 MCG CAPS  •  multivitamin (THERAGRAN) TABS  •  mupirocin (BACTROBAN) 2 % ointment  •  Omega-3 Fatty Acids (FISH OIL) 1,000 mg  •  pantoprazole (PROTONIX) 40 mg tablet  •  potassium chloride (Klor-Con) 10 mEq tablet  •  Probiotic Product (PROBIOTIC-10 PO)  •  sodium chloride (OCEAN) 0 65 % nasal spray  •  solifenacin (VESICARE) 5 mg tablet  •  Thiamine HCl (VITAMIN B-1 PO)  •  Mirabegron ER (Myrbetriq) 50 MG TB24  •  nitrofurantoin (MACROBID) 100 mg capsule  •  nystatin (MYCOSTATIN) 500,000 units/5 mL suspension  •  oxyCODONE (ROXICODONE) 5 immediate release tablet  •  sulfamethoxazole-trimethoprim (BACTRIM DS) 800-160 mg per tablet    No Known Allergies        Objective      Blood pressure 128/80, pulse 84, height 5' 1 5" (1 562 m), weight 103 kg (228 lb), SpO2 98 %  Body mass index is 42 38 kg/m²  PHYSICAL EXAM:      General Appearance:   Alert, cooperative, no distress   HEENT:   Normocephalic, atraumatic, anicteric      Neck:  Supple, symmetrical, trachea midline   Lungs:   Clear to auscultation bilaterally; no rales, rhonchi or wheezing; respirations unlabored    Heart[de-identified]   Regular rate and rhythm; no murmur, rub, or gallop  Abdomen:   Soft, non-tender, non-distended; normal bowel sounds; no masses, no organomegaly    Genitalia:   Deferred    Rectal:   Deferred    Extremities:  No cyanosis, clubbing or edema    Pulses:  2+ and symmetric    Skin:  No jaundice, rashes, or lesions    Lymph nodes:  No palpable cervical lymphadenopathy        Lab Results:   No visits with results within 1 Day(s) from this visit  Latest known visit with results is:   Hospital Outpatient Visit on 03/09/2020   Component Date Value   • Case Report 03/09/2020                      Value:Surgical Pathology Report                         Case: [id number]                                   Authorizing Provider:  Chin Marshall DO          Collected:           03/09/2020 1312              Ordering Location:      [facility]       Received:            03/09/2020 [address] [place] [address] Endoscopy                                                             Pathologist:           Junior Trevino MD                                                               Specimen:    Colon, random ascending/sigmoid                                                           • Final Diagnosis 03/09/2020                      Value: This result contains rich text formatting which cannot be displayed here  • Additional Information 03/09/2020                      Value: This result contains rich text formatting which cannot be displayed here  • Gross Description 03/09/2020                      Value: This result contains rich text formatting which cannot be displayed here  • Clinical Information 03/09/2020                      Value:Random bx r/o microscopic colitis         Radiology Results:   No results found

## 2023-02-22 NOTE — PROGRESS NOTES
[facility] - Outpatient Follow-up Note  Walter Frye 81 y o  female MRN: [id number]  Encounter: [id number]          ASSESSMENT AND PLAN:      1  Gastroesophageal reflux disease without esophagitis  2  Constipation, unspecified constipation type  -Will increase Linzess to 145 mcg daily   -Patient will continue daily exercise  -Dietary modifications to include high-protein   -Plenty of water intake     -No plans for repeat endoscopic evaluation at this time  ______________________________________________________________________    SUBJECTIVE:   74-year-old female with a past medical history significant for GERD and chronic constipation presents to the GI clinic today for follow-up  Overall patient is doing quite well  She is currently on Linzess 72 mcg  She reports that she is still having issues with constipation and incomplete evacuation of stool  She denies any severe abdominal pain  She denies any vomiting but she does report early satiety especially when she is constipated  She has been exercising daily  She reports that she is definitely lost inches and she feels great  Patient's last colonoscopy was performed on March 9, 2020  Patient had evidence of diverticulosis  Patient is due for recall colonoscopy in 2025 due to to a history of colon polyps  EGD from 3/9/2020 showed surgical changes consistent with gastric bypass  REVIEW OF SYSTEMS IS OTHERWISE NEGATIVE        Historical Information   Past Medical History:   Diagnosis Date   • Cancer [facility])    • Colon polyp    • Deviated septum    • Diverticulosis    • Endometrial cancer ([facility] [address] )    • [place] filter in place    • Hx of abdominoplasty    • Hypoglycemia      Past Surgical History:   Procedure Laterality Date   • ABDOMINOPLASTY     • CHOLECYSTECTOMY     • COLONOSCOPY     • GASTRIC BYPASS       Social History   Social History     Substance and Sexual Activity   Alcohol Use Not Currently     Social History Substance and Sexual Activity   Drug Use Never     Social History     Tobacco Use   Smoking Status Never   Smokeless Tobacco Never     Family History   Problem Relation Age of Onset   • Cancer Mother    • Heart disease Mother    • Breast cancer additional onset Mother    • Kidney disease Father    • Hypertension Father        Meds/Allergies       Current Outpatient Medications:   •  ascorbic acid (VITAMIN C) 250 MG tablet  •  aspirin 81 MG tablet  •  Biotin 1 MG CAPS  •  Calcium Carbonate-Vitamin D3 600-400 MG-UNIT TABS  •  Cholecalciferol 50 MCG (2000 UT) CAPS  •  cyanocobalamin 1,000 mcg/mL  •  fluticasone (FLONASE) 50 mcg/act nasal spray  •  furosemide (LASIX) 20 mg tablet  •  ibuprofen (MOTRIN) 800 mg tablet  •  KLOR-CON M10 10 MEQ tablet  •  Linzess 72 MCG CAPS  •  multivitamin (THERAGRAN) TABS  •  mupirocin (BACTROBAN) 2 % ointment  •  Omega-3 Fatty Acids (FISH OIL) 1,000 mg  •  pantoprazole (PROTONIX) 40 mg tablet  •  potassium chloride (Klor-Con) 10 mEq tablet  •  Probiotic Product (PROBIOTIC-10 PO)  •  sodium chloride (OCEAN) 0 65 % nasal spray  •  solifenacin (VESICARE) 5 mg tablet  •  Thiamine HCl (VITAMIN B-1 PO)  •  Mirabegron ER (Myrbetriq) 50 MG TB24  •  nitrofurantoin (MACROBID) 100 mg capsule  •  nystatin (MYCOSTATIN) 500,000 units/5 mL suspension  •  oxyCODONE (ROXICODONE) 5 immediate release tablet  •  sulfamethoxazole-trimethoprim (BACTRIM DS) 800-160 mg per tablet    No Known Allergies        Objective     Blood pressure 128/80, pulse 84, height 5' 1 5" (1 562 m), weight 103 kg (228 lb), SpO2 98 %  Body mass index is 42 38 kg/m²  PHYSICAL EXAM:      General Appearance:   Alert, cooperative, no distress   HEENT:   Normocephalic, atraumatic, anicteric      Neck:  Supple, symmetrical, trachea midline   Lungs:   Clear to auscultation bilaterally; no rales, rhonchi or wheezing; respirations unlabored    Heart[de-identified]   Regular rate and rhythm; no murmur, rub, or gallop     Abdomen:   Soft, non-tender, non-distended; normal bowel sounds; no masses, no organomegaly    Genitalia:   Deferred    Rectal:   Deferred    Extremities:  No cyanosis, clubbing or edema    Pulses:  2+ and symmetric    Skin:  No jaundice, rashes, or lesions    Lymph nodes:  No palpable cervical lymphadenopathy        Lab Results:   No visits with results within 1 Day(s) from this visit  Latest known visit with results is:   Hospital Outpatient Visit on 03/09/2020   Component Date Value   • Case Report 03/09/2020                      Value:Surgical Pathology Report                         Case: [id number]                                   Authorizing Provider:  Jennifer Zheng DO          Collected:           03/09/2020 1312              Order[facility]      [facility]       Received:            03/09/2020 [address] [facility] Endoscopy                                                             Pathologist:           Dedra Cheng MD                                                               Specimen:    Colon, random ascending/sigmoid                                                           • Final Diagnosis 03/09/2020                      Value: This result contains rich text formatting which cannot be displayed here  • Additional Information 03/09/2020                      Value: This result contains rich text formatting which cannot be displayed here  • Gross Description 03/09/2020                      Value: This result contains rich text formatting which cannot be displayed here  • Clinical Information 03/09/2020                      Value:Random bx r/o microscopic colitis         Radiology Results:   No results found

## 2023-02-28 ENCOUNTER — NURSE TRIAGE (OUTPATIENT)
Dept: OTHER | Facility: OTHER | Age: 73
End: 2023-02-28

## 2023-02-28 ENCOUNTER — TELEPHONE (OUTPATIENT)
Dept: GASTROENTEROLOGY | Facility: CLINIC | Age: 73
End: 2023-02-28

## 2023-02-28 NOTE — TELEPHONE ENCOUNTER
Reason for Disposition  • [1] Follow-up call to recent contact AND [2] information only call, no triage required    Answer Assessment - Initial Assessment Questions  1  REASON FOR CALL or QUESTION: "What is your reason for calling today?" or "How can I best help you?" or "What question do you have that I can help answer?"      I may have taken 2 doses of linzess today, I thought I dropped a pill on the floor but now I can't find it  Can someone please call me back about taking 72mg x2 when my 145mg pills run out      Protocols used: INFORMATION ONLY CALL - NO TRIAGE-[facility]

## 2023-02-28 NOTE — TELEPHONE ENCOUNTER
Regarding: Medication Question  ----- Message from Ny Narayan sent at 2/28/2023  4:55 PM EST -----  "I called the office earlier about a question that I have regarding my 145 linzess medication   No one has called me back"

## 2023-02-28 NOTE — TELEPHONE ENCOUNTER
----- Message from London Cisse sent at 2/28/2023  4:02 PM EST -----  Regarding: Medication   Contact: [phone]  I have been taking the 145 linzess it's great I have a number of 72 mg can I take two of them when I run out of the 145 mg , also I might have dropped my linzess 145 this after noon I thought but I might have taken it by mistake I had one in

## 2023-03-01 NOTE — TELEPHONE ENCOUNTER
Please inform patient that she can double up on the 72 mcg until she has completed that and I have no problem giving her more 145's when she needs them!

## 2023-03-14 DIAGNOSIS — K59.00 CONSTIPATION, UNSPECIFIED CONSTIPATION TYPE: Primary | ICD-10-CM

## 2023-03-14 NOTE — TELEPHONE ENCOUNTER
----- Message from Emile Duvall sent at 3/14/2023  4:24 PM EDT -----  Regarding: medication  Contact: [phone]  Harjit Ledbetter send my new linzess 145 mg to [facility] heather [facility]ling

## 2023-03-14 NOTE — TELEPHONE ENCOUNTER
----- Message from Lilian Ramírez sent at 3/14/2023  4:24 PM EDT -----  Regarding: medication  Contact: [phone]  Harjit Ledbetter send my new linzess 145 mg to [facility]

## 2023-03-15 ENCOUNTER — TELEPHONE (OUTPATIENT)
Dept: GASTROENTEROLOGY | Facility: CLINIC | Age: 73
End: 2023-03-15

## 2023-03-15 RX ORDER — LINACLOTIDE 145 UG/1
145 CAPSULE, GELATIN COATED ORAL
Qty: 90 CAPSULE | Refills: 3 | Status: SHIPPED | OUTPATIENT
Start: 2023-03-15

## 2023-03-15 NOTE — TELEPHONE ENCOUNTER
----- Message from [place] sent at 3/15/2023  3:58 PM EDT -----  Regarding: Heart burn  Contact: [phone]  Yes I did I think I know what happened I was taking my antibiotics and reg pills without water and I think a few got stuck and irritated my esophagus all better sorry

## 2023-06-06 ENCOUNTER — TELEPHONE (OUTPATIENT)
Dept: GASTROENTEROLOGY | Facility: CLINIC | Age: 73
End: 2023-06-06

## 2023-06-12 ENCOUNTER — TELEPHONE (OUTPATIENT)
Dept: GASTROENTEROLOGY | Facility: CLINIC | Age: 73
End: 2023-06-12

## 2023-06-13 ENCOUNTER — TELEPHONE (OUTPATIENT)
Dept: GASTROENTEROLOGY | Facility: CLINIC | Age: 73
End: 2023-06-13

## 2023-06-13 NOTE — TELEPHONE ENCOUNTER
Submitted [place] through covermymeds - Linzess 145 [facility] [place] [id number]        Pharmacy  Benefit:  [facility] [phone]  ID# [id number]  BIN: [id number]  [place] Case: [id number], Status: APPROVED, Coverage Starts on: 6/13/2023 12:00 AM, Coverage Ends on: 6/13/2024 12:00 AM  Questions? Contact [id number]      [facility] was advised

## 2023-06-14 DIAGNOSIS — K21.9 GASTROESOPHAGEAL REFLUX DISEASE WITHOUT ESOPHAGITIS: ICD-10-CM

## 2023-06-14 RX ORDER — PANTOPRAZOLE SODIUM 40 MG/1
TABLET, DELAYED RELEASE ORAL
Qty: 180 TABLET | Refills: 0 | Status: SHIPPED | OUTPATIENT
Start: 2023-06-14

## 2023-06-16 ENCOUNTER — TELEPHONE (OUTPATIENT)
Dept: GASTROENTEROLOGY | Facility: CLINIC | Age: 73
End: 2023-06-16

## 2023-08-31 ENCOUNTER — TELEPHONE (OUTPATIENT)
Dept: GASTROENTEROLOGY | Facility: CLINIC | Age: 73
End: 2023-08-31

## 2023-08-31 NOTE — TELEPHONE ENCOUNTER
Regarding: FWCornelia RAYMUNDO  Contact: [phone]  You are the best.  Thank you I did see it. Please inform patient that I did review. Everything looks good!  ----- Message -----  From: Eber Osler  Sent: 8/31/2023   2:33 PM EDT  To: Gloria Raines PA-C  Subject: MARILUMOISES                                              ----- Message from Eber Osler sent at 8/31/2023  2:33 PM EDT -----  When you go into her chart, do not go to care everywhere.  click on the imaging tab and its on there.     ----- Message from [facility] to Gloria Raines PA-C sent at 8/30/2023 11:10 AM -----   I had a MRI  and CT scan 8 /28 @ [facility] I believe you have access to my chart

## 2023-10-06 DIAGNOSIS — K21.9 GASTROESOPHAGEAL REFLUX DISEASE WITHOUT ESOPHAGITIS: ICD-10-CM

## 2023-10-06 RX ORDER — PANTOPRAZOLE SODIUM 40 MG/1
TABLET, DELAYED RELEASE ORAL
Qty: 180 TABLET | Refills: 0 | Status: SHIPPED | OUTPATIENT
Start: 2023-10-06

## 2023-10-30 ENCOUNTER — OFFICE VISIT (OUTPATIENT)
Dept: NEUROLOGY | Facility: CLINIC | Age: 73
End: 2023-10-30
Payer: MEDICARE

## 2023-10-30 VITALS
HEIGHT: 61 IN | TEMPERATURE: 96.5 F | BODY MASS INDEX: 43.76 KG/M2 | DIASTOLIC BLOOD PRESSURE: 76 MMHG | OXYGEN SATURATION: 98 % | SYSTOLIC BLOOD PRESSURE: 122 MMHG | HEART RATE: 58 BPM | WEIGHT: 231.8 LBS

## 2023-10-30 DIAGNOSIS — R42 DIZZINESS AND GIDDINESS: Primary | ICD-10-CM

## 2023-10-30 DIAGNOSIS — R41.9 COGNITIVE COMPLAINTS: ICD-10-CM

## 2023-10-30 DIAGNOSIS — I67.9 CEREBROVASCULAR SMALL VESSEL DISEASE: ICD-10-CM

## 2023-10-30 DIAGNOSIS — E53.8 LOW SERUM VITAMIN B12: ICD-10-CM

## 2023-10-30 PROCEDURE — 99204 OFFICE O/P NEW MOD 45 MIN: CPT | Performed by: PSYCHIATRY & NEUROLOGY

## 2023-10-30 RX ORDER — FEXOFENADINE HCL 180 MG/1
180 TABLET ORAL DAILY
COMMUNITY

## 2023-10-30 NOTE — PROGRESS NOTES
Patient ID: Thao Rodriguez is a 72 y.o. female. Assessment/Plan:           Problem List Items Addressed This Visit          Cardiovascular and Mediastinum    Cerebrovascular small vessel disease    Relevant Orders    Ambulatory Referral to Speech Therapy       Other    Cognitive complaints    Relevant Orders    Ambulatory Referral to Speech Therapy    Low serum vitamin B12    Relevant Medications    Cyanocobalamin 1000 MCG SUBL    Other Relevant Orders    Ambulatory Referral to Speech Therapy    Dizziness and giddiness - Primary    Relevant Medications    Cyanocobalamin 1000 MCG SUBL        Mrs. Barillas has presented to [facility] multiple sclerosis center for relation of abnormal brain imaging. Patient also describes having intermittent bouts of dizziness with cognitive decline. Patient completed full evaluation by [facility] neurology Dr. Judie Gomez in September 2022 and she is here for follow-up. We personally reviewed patient imaging, we agreed patient has no acute or chronic ischemic or hemorrhagic changes, no signs of demyelination, no signs of mass or other intracranial pathology. Patient has no specific temporal area neurodegenerative process with no signs of hydrocephalus. Similar study was described last year. Patient continue describing dizziness and worsening cognitive function. Patient has longstanding history of gastric bypass with B12 deficiency requiring monthly B12 injection. Despite the injection her level is below 400 as she was offered cognitive therapy evaluation in addition to vitamin B12 1000 mcg sublingual supplements; We agreed that since patient discontinued smoking 30 years ago as well as taking care of her blood pressure/hyperglycemia/hypercholesterolemia, I would not have any further concern for cerebrovascular small vessel disease as she has normal healthy aging brain without any problem noted at this point.   Patient is to follow-up with primary care team for evaluation of cardiac function versus carotid Doppler ultrasound if clinically advised. No repeating MRI advised unless new focal neurological deficit reported. Patient would benefit from establishing with Senior care/geriatric team if cognitive dysfunction actually has noticeable progression, but working with [facility] cognitive therapy will be highly advised at this point. Patient ambulates with a cane. Patient is to follow-up with [facility] neurology on as-needed basis, patient would benefit to establish with [facility] neurology practice. Subjective: White matter disease on brain MRI, dizziness, cognitive dysfunction    HPI  Mrs. Barillas is a 46-year-old female who presented to [facility] multiple sclerosis center for follow-up on abnormal brain MRI findings. Patient has remote history of gastric bypass with hypoglycemia, remote history of smoking 30 years ago. Dr. Valeriy Ng evaluated 9/22/2022: have advised patient to monitor her blood pressure to 3 times a week. If it is consistently elevated, she must be started on antihypertensive to keep her blood pressure below 140/80. A1c goal is less than 7  LDL goal is less than 100- current LDL 87  She quit smoking several years ago. Denies any exposure to secondhand smoke. She already takes aspirin 81 mg daily. No prior history of strokes on MRI. Patient states that she does not snore anymore after weight loss surgery. She wakes up in the morning feeling refreshed. Discussed increased risk of stroke or dementia if these white matter hyperintensities continue to progress secondary to her vascular risk factors. MRI brain December 2021 with and without contrast was within normal limits. Showed nonspecific white matter changes. Patient was referred here for evaluation of abnormal brain scan.      The following portions of the patient's history were reviewed and updated as appropriate: She  has a [facility] Colon polyp, Deviated septum, Diverticulosis, Endometrial cancer ([address]), [place] filter in place, abdominoplasty, Hypoglycemia, Sleep apnea (Unnone), and Syncope. She   Patient Active Problem List    Diagnosis Date Noted    Cerebrovascular small vessel disease 10/30/2023    Cognitive complaints 10/30/2023    Low serum vitamin B12 10/30/2023    Dizziness and giddiness 10/30/2023     She  has a past surgical history that includes Colonoscopy; Cholecystectomy; Gastric bypass; and Abdominoplasty. Her family history includes Breast cancer additional onset in her mother; Cancer in her mother; Heart disease in her mother; Hypertension in her father; Kidney disease in her father; Neuropathy in her mother. She  reports that she quit smoking about 33 years ago. Her smoking use included cigarettes. She started smoking about 53 years ago. She has a 40.00 pack-year smoking history. She has never used smokeless tobacco. She reports that she does not drink alcohol and does not use drugs. Current Outpatient Medications   Medication Sig Dispense Refill    ascorbic acid (VITAMIN C) 250 MG tablet Take 1,000 mg by mouth 2 (two) times a day      aspirin 81 MG tablet Take 81 mg by mouth daily      Calcium Carbonate-Vitamin D3 600-400 MG-UNIT TABS Take by mouth daily      Cholecalciferol 50 MCG (2000 UT) CAPS Take 1 capsule by mouth daily      cyanocobalamin 1,000 mcg/mL INJECT 1 ML INJECT INTO THE MUSCLE EVERY 30 (THIRTY) DAYS.   5    Cyanocobalamin 1000 MCG SUBL Place 1 tablet (1,000 mcg total) under the tongue daily 90 tablet 0    fluticasone (FLONASE) 50 mcg/act nasal spray SPRAY 2 SPRAYS INTO EACH NOSTRIL EVERY DAY  3    furosemide (LASIX) 20 mg tablet Take 20 mg by mouth daily  1    ibuprofen (MOTRIN) 800 mg tablet Take 800 mg by mouth      KLOR-CON M10 10 MEQ tablet Take 10 mEq by mouth 2 (two) times a day  0    Linzess 145 MCG CAPS Take 1 capsule (145 mcg total) by mouth daily in the early morning 90 capsule 3    meclizine (ANTIVERT) 25 mg tablet TAKE 1 TABLET BY MOUTH THREE TIMES A DAY AS NEEDED FOR DIZZINESS      multivitamin (THERAGRAN) TABS Take 1 tablet by mouth daily      pantoprazole (PROTONIX) 40 mg tablet TAKE 1 TABLET (40 MG TOTAL) BY MOUTH TWO (TWO) TIMES A  tablet 0    Probiotic Product (PROBIOTIC-10 PO) Probiotic      sodium chloride (OCEAN) 0.65 % nasal spray 1 spray into each nostril      Thiamine HCl (VITAMIN B-1 PO) Take 100 mg by mouth daily      chlorhexidine (HIBICLENS) 4 % external liquid Apply by topical route in the shower daily for 5 nights before surgery (Patient not taking: Reported on 10/30/2023)      fexofenadine (ALLEGRA) 180 MG tablet Take 180 mg by mouth daily      nitrofurantoin (MACROBID) 100 mg capsule nitrofurantoin monohydrate/macrocrystals 100 mg capsule   TAKE 1 CAPSULE BY MOUTH TWICE A DAY FOR 7 DAYS (Patient not taking: Reported on 2/22/2023)      nystatin (MYCOSTATIN) 500,000 units/5 mL suspension nystatin 100,000 unit/mL oral suspension   TAKE 5 ML BY MOUTH 4 TIMES A DAY FOR 10 DAYS. (Patient not taking: Reported on 2/22/2023)      Omega-3 Fatty Acids (FISH OIL) 1,000 mg Take 1,000 mg by mouth daily (Patient not taking: Reported on 10/30/2023)      potassium chloride (Klor-Con) 10 mEq tablet Klor-Con 10 mEq tablet,extended release      sulfamethoxazole-trimethoprim (BACTRIM DS) 800-160 mg per tablet TAKE 1 TABLET BY MOUTH TWICE A DAY FOR 7 DAYS (Patient not taking: Reported on 2/22/2023)       No current facility-administered medications for this visit. Current Outpatient Medications on File Prior to Visit   Medication Sig    ascorbic acid (VITAMIN C) 250 MG tablet Take 1,000 mg by mouth 2 (two) times a day    aspirin 81 MG tablet Take 81 mg by mouth daily    Calcium Carbonate-Vitamin D3 600-400 MG-UNIT TABS Take by mouth daily    Cholecalciferol 50 MCG (2000 UT) CAPS Take 1 capsule by mouth daily    cyanocobalamin 1,000 mcg/mL INJECT 1 ML INJECT INTO THE MUSCLE EVERY 30 (THIRTY) DAYS.     fluticasone (FLONASE) 50 mcg/act nasal spray SPRAY 2 SPRAYS INTO EACH NOSTRIL EVERY DAY    furosemide (LASIX) 20 mg tablet Take 20 mg by mouth daily    ibuprofen (MOTRIN) 800 mg tablet Take 800 mg by mouth    KLOR-CON M10 10 MEQ tablet Take 10 mEq by mouth 2 (two) times a day    Linzess 145 MCG CAPS Take 1 capsule (145 mcg total) by mouth daily in the early morning    meclizine (ANTIVERT) 25 mg tablet TAKE 1 TABLET BY MOUTH THREE TIMES A DAY AS NEEDED FOR DIZZINESS    multivitamin (THERAGRAN) TABS Take 1 tablet by mouth daily    pantoprazole (PROTONIX) 40 mg tablet TAKE 1 TABLET (40 MG TOTAL) BY MOUTH TWO (TWO) TIMES A DAY    Probiotic Product (PROBIOTIC-10 PO) Probiotic    sodium chloride (OCEAN) 0.65 % nasal spray 1 spray into each nostril    Thiamine HCl (VITAMIN B-1 PO) Take 100 mg by mouth daily    chlorhexidine (HIBICLENS) 4 % external liquid Apply by topical route in the shower daily for 5 nights before surgery (Patient not taking: Reported on 10/30/2023)    fexofenadine (ALLEGRA) 180 MG tablet Take 180 mg by mouth daily    nitrofurantoin (MACROBID) 100 mg capsule nitrofurantoin monohydrate/macrocrystals 100 mg capsule   TAKE 1 CAPSULE BY MOUTH TWICE A DAY FOR 7 DAYS (Patient not taking: Reported on 2/22/2023)    nystatin (MYCOSTATIN) 500,000 units/5 mL suspension nystatin 100,000 unit/mL oral suspension   TAKE 5 ML BY MOUTH 4 TIMES A DAY FOR 10 DAYS. (Patient not taking: Reported on 2/22/2023)    Omega-3 Fatty Acids (FISH OIL) 1,000 mg Take 1,000 mg by mouth daily (Patient not taking: Reported on 10/30/2023)    potassium chloride (Klor-Con) 10 mEq tablet Klor-Con 10 mEq tablet,extended release    sulfamethoxazole-trimethoprim (BACTRIM DS) 800-160 mg per tablet TAKE 1 TABLET BY MOUTH TWICE A DAY FOR 7 DAYS (Patient not taking: Reported on 2/22/2023)     No current facility-administered medications on file prior to visit. She has No Known Allergies. .         Objective:    Blood pressure 122/76, pulse 58, temperature (!) 96.5 °F (35.8 °C), temperature source Temporal, height 5' 1" (1.549 m), weight 105 kg (231 lb 12.8 oz), SpO2 98 %. Physical Exam    Neurological Exam      ROS:    Review of Systems   Constitutional:  Negative for appetite change, fatigue and fever. HENT:  Positive for tinnitus (both ears). Negative for hearing loss, trouble swallowing and voice change. Eyes:  Positive for pain and visual disturbance. Negative for photophobia. Respiratory: Negative. Negative for shortness of breath. Cardiovascular: Negative. Negative for palpitations. Gastrointestinal: Negative. Negative for nausea and vomiting. Endocrine: Positive for cold intolerance. Genitourinary:  Positive for urgency. Negative for dysuria and frequency. Musculoskeletal:  Positive for gait problem (right knee replaced recently). Negative for back pain, myalgias and neck pain. Skin: Negative. Negative for rash. Allergic/Immunologic: Negative. Neurological:  Positive for dizziness. Negative for tremors, seizures, syncope, facial asymmetry, speech difficulty, weakness, light-headedness, numbness and headaches. Hematological: Negative. Does not bruise/bleed easily. Psychiatric/Behavioral:  Positive for sleep disturbance. Negative for confusion and hallucinations. All other systems reviewed and are negative.

## 2024-01-02 ENCOUNTER — OFFICE VISIT (OUTPATIENT)
Dept: GASTROENTEROLOGY | Facility: CLINIC | Age: 74
End: 2024-01-02
Payer: MEDICARE

## 2024-01-02 VITALS
DIASTOLIC BLOOD PRESSURE: 83 MMHG | WEIGHT: 225.2 LBS | OXYGEN SATURATION: 65 % | SYSTOLIC BLOOD PRESSURE: 135 MMHG | HEIGHT: 61 IN | BODY MASS INDEX: 42.52 KG/M2 | HEART RATE: 97 BPM

## 2024-01-02 DIAGNOSIS — K59.00 CONSTIPATION, UNSPECIFIED CONSTIPATION TYPE: ICD-10-CM

## 2024-01-02 DIAGNOSIS — K21.9 GASTROESOPHAGEAL REFLUX DISEASE WITHOUT ESOPHAGITIS: Primary | ICD-10-CM

## 2024-01-02 PROCEDURE — 99213 OFFICE O/P EST LOW 20 MIN: CPT | Performed by: PHYSICIAN ASSISTANT

## 2024-01-02 NOTE — PATIENT INSTRUCTIONS
Abdominal Pain   WHAT YOU NEED TO KNOW:   Abdominal pain can be dull, achy, or sharp. You may have pain in one area of your abdomen, or in your entire abdomen. Your pain may be caused by a condition such as constipation, food sensitivity or poisoning, infection, or a blockage. Abdominal pain can also be from a hernia, appendicitis, or an ulcer. Liver, gallbladder, or kidney conditions can also cause abdominal pain. The cause of your abdominal pain may not be known.       DISCHARGE INSTRUCTIONS:   Call your local emergency number (911 in the [place]) if:   You have chest pain or shortness of breath.      Return to the emergency department if:   You have pulsing pain in your upper abdomen or lower back that suddenly becomes constant.    Your pain is in the right lower abdominal area and worsens with movement.    You have a fever over 100.4°F (38°C) or shaking chills.    You are vomiting and cannot keep food or liquids down.    Your pain does not improve or gets worse over the next 8 to 12 hours.    You see blood in your vomit or bowel movements, or they look black and tarry.    Your skin or the whites of your eyes turn yellow.    You are a woman and have a large amount of vaginal bleeding that is not your monthly period.    Call your doctor if:   You have pain in your lower back.    You are a man and have pain in your testicles.    You have pain when you urinate.    You have questions or concerns about your condition or care.    Medicines:  You may need any of the following:  Medicines  may be given to calm your stomach or prevent vomiting.    Prescription pain medicine  may be given. Ask your healthcare provider how to take this medicine safely. Some prescription pain medicines contain acetaminophen. Do not take other medicines that contain acetaminophen without talking to your healthcare provider. Too much acetaminophen may cause liver damage. Prescription pain medicine may cause constipation. Ask your healthcare  provider how to prevent or treat constipation.     Take your medicine as directed.  Contact your healthcare provider if you think your medicine is not helping or if you have side effects. Tell your provider if you are allergic to any medicine. Keep a list of the medicines, vitamins, and herbs you take. Include the amounts, and when and why you take them. Bring the list or the pill bottles to follow-up visits. Carry your medicine list with you in case of an emergency.    Manage or prevent abdominal pain:   Apply heat  on your abdomen for 20 to 30 minutes every 2 hours for as many days as directed. Heat helps decrease pain and muscle spasms.    Make changes to the foods you eat, if needed.  Do not eat foods that cause abdominal pain or other symptoms. Eat small meals more often. The following changes may also help:    Eat more high-fiber foods if you are constipated.  High-fiber foods include fruits, vegetables, whole-grain foods, and legumes such as flood beans.         Do not eat foods that cause gas if you have bloating.  Examples include broccoli, cabbage, beans, and carbonated drinks.    Do not eat foods or drinks that contain sorbitol or fructose if you have diarrhea and bloating.  Some examples are fruit juices, candy, jelly, and sugar-free gum.    Do not eat high-fat foods.  Examples include fried foods, cheeseburgers, hot dogs, and desserts.    Make changes to the liquids you drink, if needed.  Do not drink liquids that cause pain or make it worse, such as orange juice. Drink liquids throughout the day to stay hydrated. The following changes may also help:    Drink more liquids to prevent dehydration from diarrhea or vomiting.  Ask your healthcare provider how much liquid to drink each day and which liquids are best for you.    Limit or do not have caffeine.  Caffeine may make symptoms such as heartburn or nausea worse.    Limit or do not drink alcohol.  Alcohol can make your abdominal pain worse. Ask your  healthcare provider if it is okay for you to drink alcohol. Also ask how much is okay for you to drink. A drink of alcohol is 12 ounces of beer, ½ ounce of liquor, or 5 ounces of wine.    Keep a diary of your abdominal pain.  A diary may help your healthcare provider learn what is causing your pain. Include when the pain happens, how long it lasts, and what the pain feels like. Write down any other symptoms you have with abdominal pain. Also write down what you eat, and any symptoms you have after you eat.    Manage stress.  Stress may cause abdominal pain. Your healthcare provider may recommend relaxation techniques and deep breathing exercises to help decrease your stress. Your healthcare provider may recommend you talk to someone about your stress or anxiety, such as a counselor or a friend. Get plenty of sleep. Exercise regularly.         Do not smoke.  Nicotine and other chemicals in cigarettes can damage your esophagus and stomach. Ask your healthcare provider for information if you currently smoke and need help to quit. E-cigarettes or smokeless tobacco still contain nicotine. Talk to your healthcare provider before you use these products.    Follow up with your doctor as directed:  Write down your questions so you remember to ask them during your visits.  © Copyright [employer] 2023 Information is for End User's use only and may not be sold, redistributed or otherwise used for commercial purposes.  The above information is an  only. It is not intended as medical advice for individual conditions or treatments. Talk to your doctor, nurse or pharmacist before following any medical regimen to see if it is safe and effective for you.

## 2024-01-02 NOTE — PROGRESS NOTES
Answers submitted by the patient for this visit:  Abdominal Pain Questionnaire (Submitted on 12/30/2023)  Chief Complaint: Abdominal pain  Progression since onset: resolved  Pain - numeric: 0/10  anorexia: Yes  arthralgias: Yes  belching: Yes  constipation: No  diarrhea: No  dysuria: No  fever: No  flatus: No  frequency: Yes  headaches: No  hematochezia: No  hematuria: No  melena: No  myalgias: Yes  nausea: No  weight loss: No  vomiting: No  Aggravated by: nothing  Relieved by: nothing  [facility] - Outpatient Follow-up Note  Janeth Barillas 73 y.o. female MRN: [id number]  Encounter: [id number]          ASSESSMENT AND PLAN:      1. Gastroesophageal reflux disease without esophagitis  2. Constipation, unspecified constipation type  -Will continue Pantoprazole 40mg BID and Linzess 145mcg daily.     -Continue GERD dietary and lifestyle modifications.    -No plans for repeat EGD and colonoscopy at this time.   ______________________________________________________________________    SUBJECTIVE:    73-year-old female with a past medical history significant for GERD, IBS, history of endometrial cancer, history of colon polyp, chronic constipation, and sleep apnea who presents to the GI clinic today for follow-up.  Patient currently follows with the GI team for GERD and chronic constipation.  Patient is currently maintained on pantoprazole 40 mg twice a day for her acid reflux and Linzess 145 mcg daily for her constipation.  Patient reports that she is doing quite well.  She reports that all of her symptoms are under great control.  She reports that she is working out daily and is really watching her diet.  She overall feels the best she has felt in quite some time.  She reports that her biggest issue is that she is still suffering with right knee pain and she is questioning a right knee replacement.    Colonoscopy from March 2020 showed diverticular disease.  EGD from 2020 showed surgical  changes consistent with gastric bypass otherwise normal EGD.    REVIEW OF SYSTEMS IS OTHERWISE NEGATIVE.      Historical Information   Past Medical History:   Diagnosis Date    Cancer (HCC)     Colon polyp     Deviated septum     Diverticulosis     Endometrial cancer (HCC)     [place] filter in place     Hx of abdominoplasty     Hypoglycemia     Sleep apnea Unnone    Mild    Syncope     Veritgo from time to time     Past Surgical History:   Procedure Laterality Date    ABDOMINOPLASTY      CHOLECYSTECTOMY      COLONOSCOPY      GASTRIC BYPASS       Social History   Social History     Substance and Sexual Activity   Alcohol Use Never     Social History     Substance and Sexual Activity   Drug Use Never     Social History     Tobacco Use   Smoking Status Former    Current packs/day: 0.00    Average packs/day: 2.0 packs/day for 20.8 years (41.6 ttl pk-yrs)    Types: Cigarettes    Start date: 1970    Quit date: 10/19/1990    Years since quittin.2   Smokeless Tobacco Never   Tobacco Comments    Quit      Family History   Problem Relation Age of Onset    Cancer Mother     Heart disease Mother     Breast cancer additional onset Mother     Neuropathy Mother     Kidney disease Father     Hypertension Father        Meds/Allergies       Current Outpatient Medications:     ascorbic acid (VITAMIN C) 250 MG tablet    aspirin 81 MG tablet    Calcium Carbonate-Vitamin D3 600-400 MG-UNIT TABS    chlorhexidine (HIBICLENS) 4 % external liquid    Cholecalciferol 50 MCG ( UT) CAPS    cyanocobalamin 1,000 mcg/mL    Cyanocobalamin 1000 MCG SUBL    fexofenadine (ALLEGRA) 180 MG tablet    fluticasone (FLONASE) 50 mcg/act nasal spray    furosemide (LASIX) 20 mg tablet    ibuprofen (MOTRIN) 800 mg tablet    KLOR-CON M10 10 MEQ tablet    Linzess 145 MCG CAPS    meclizine (ANTIVERT) 25 mg tablet    multivitamin (THERAGRAN) TABS    pantoprazole (PROTONIX) 40 mg tablet    potassium chloride (Klor-Con) 10 mEq tablet    Probiotic  "Product (PROBIOTIC-10 PO)    sodium chloride (OCEAN) 0.65 % nasal spray    Thiamine HCl (VITAMIN B-1 PO)    No Known Allergies        Objective     Blood pressure 135/83, pulse 97, height 5' 1\" (1.549 m), weight 102 kg (225 lb 3.2 oz), SpO2 (!) 65%. Body mass index is 42.55 kg/m².      PHYSICAL EXAM:      General Appearance:   Alert, cooperative, no distress   HEENT:   Normocephalic, atraumatic, anicteric.     Neck:  Supple, symmetrical, trachea midline   Lungs:   Clear to auscultation bilaterally; no rales, rhonchi or wheezing; respirations unlabored    Heart::   Regular rate and rhythm; no murmur, rub, or gallop.   Abdomen:   Soft, non-tender, non-distended; normal bowel sounds; no masses, no organomegaly    Genitalia:   Deferred    Rectal:   Deferred    Extremities:  No cyanosis, clubbing or edema    Pulses:  2+ and symmetric    Skin:  No jaundice, rashes, or lesions    Lymph nodes:  No palpable cervical lymphadenopathy        Lab Results:   No visits with results within 1 Day(s) from this visit.   Latest known visit with results is:   Hospital Outpatient Visit on 03/09/2020   Component Date Value    Case Report 03/09/2020                      Value:Surgical Pathology Report                         Case: [id number]                                   Authorizing Provider:  Gilbert Thomas DO          Collected:           03/09/2020 1312              Ordering Location:      [facility]       Received:            03/09/2020 [address] [place] [address] Endoscopy                                                             Pathologist:           Pamela Crump MD                                                               Specimen:    Colon, random ascending/sigmoid                                                            Final Diagnosis 03/09/2020                      Value:This result contains rich text formatting which cannot be displayed here.    Additional Information 03/09/2020           "            Value:This result contains rich text formatting which cannot be displayed here.    Gross Description 03/09/2020                      Value:This result contains rich text formatting which cannot be displayed here.    Clinical Information 03/09/2020                      Value:Random bx r/o microscopic colitis         Radiology Results:   No results found.

## 2024-01-28 DIAGNOSIS — K59.00 CONSTIPATION, UNSPECIFIED: ICD-10-CM

## 2024-01-29 RX ORDER — LINACLOTIDE 72 UG/1
CAPSULE, GELATIN COATED ORAL
Qty: 30 CAPSULE | Refills: 3 | Status: SHIPPED | OUTPATIENT
Start: 2024-01-29

## 2024-01-31 DIAGNOSIS — K21.9 GASTROESOPHAGEAL REFLUX DISEASE WITHOUT ESOPHAGITIS: ICD-10-CM

## 2024-01-31 RX ORDER — PANTOPRAZOLE SODIUM 40 MG/1
TABLET, DELAYED RELEASE ORAL
Qty: 180 TABLET | Refills: 0 | Status: SHIPPED | OUTPATIENT
Start: 2024-01-31

## 2024-02-07 DIAGNOSIS — K59.00 CONSTIPATION, UNSPECIFIED CONSTIPATION TYPE: Primary | ICD-10-CM

## 2024-02-07 RX ORDER — LINACLOTIDE 145 UG/1
145 CAPSULE, GELATIN COATED ORAL DAILY
Qty: 90 CAPSULE | Refills: 3 | Status: SHIPPED | OUTPATIENT
Start: 2024-02-07

## 2024-02-10 DIAGNOSIS — R42 DIZZINESS AND GIDDINESS: ICD-10-CM

## 2024-02-10 DIAGNOSIS — E53.8 LOW SERUM VITAMIN B12: ICD-10-CM

## 2024-02-12 RX ORDER — MAGNESIUM 200 MG
TABLET ORAL
Qty: 90 TABLET | Refills: 0 | Status: SHIPPED | OUTPATIENT
Start: 2024-02-12

## 2024-05-17 DIAGNOSIS — R42 DIZZINESS AND GIDDINESS: ICD-10-CM

## 2024-05-17 DIAGNOSIS — E53.8 LOW SERUM VITAMIN B12: ICD-10-CM

## 2024-05-17 RX ORDER — MAGNESIUM 200 MG
TABLET ORAL
Qty: 90 TABLET | Refills: 0 | Status: SHIPPED | OUTPATIENT
Start: 2024-05-17

## 2024-06-12 DIAGNOSIS — K21.9 GASTROESOPHAGEAL REFLUX DISEASE WITHOUT ESOPHAGITIS: ICD-10-CM

## 2024-06-12 RX ORDER — PANTOPRAZOLE SODIUM 40 MG/1
TABLET, DELAYED RELEASE ORAL
Qty: 180 TABLET | Refills: 0 | Status: SHIPPED | OUTPATIENT
Start: 2024-06-12

## 2024-07-05 ENCOUNTER — NURSE TRIAGE (OUTPATIENT)
Age: 74
End: 2024-07-05

## 2024-07-05 ENCOUNTER — PATIENT MESSAGE (OUTPATIENT)
Dept: NEUROLOGY | Facility: CLINIC | Age: 74
End: 2024-07-05

## 2024-07-05 NOTE — PATIENT COMMUNICATION
Unable to locate Vitamin B12 lab in . Called [employer] [phone]. Spoke w/Liba. She states not all labwork is in the  system, including Vitamin B12 lab.     Vitamin B12 CPT code - 05732    Per Liba, lawler would be $49.47. This price is good for a [facility] lab only. If pt used an outside facility, she will need to contact them for quote. As long as she provides CPT code, they will be able to give pt a price.       MyChart msg sent to pt w/above info.

## 2024-07-05 NOTE — TELEPHONE ENCOUNTER
"Patient called very concerned because last evening when she was speaking with her daughter, one of her sentences (patient's) came out garbled. There were no cognitive changes. Patient was concerned her B-12 level may be low. Patient had her last B-12 injection on 6-16-24.Reviewed signs and symptoms of when to go to the ED but not limited to; change in LOC, sudden severe headache, blurred vision, intractable vomiting.   Patient has excessive edema causing her to take deep breaths. This is being managed by her PCP.     Patient would like Dr Coleman to be made aware and advise.     Reason for Disposition   Nursing judgment    Answer Assessment - Initial Assessment Questions  1. REASON FOR CALL: \"What is your main concern right now?\" Garbled speech  \"Patient had garbled speech for 1 sentence. Then resolved. Patient is very concerned.    2. ONSET: \"When did the start?\" Last night- one time         3. SEVERITY:  mild      4. FEVER: \"Do you have a fever?\" No        5. OTHER SYMPTOMS:   Last evening patient felt like she had to take deep breaths. Patient believes this is due to her edema. Patient's physical therapist told her to make sure she is taking Lasix 20 mg and Klor-con 10 mg daily and use the sleeve on her left leg to release the edema.  Patient lost 5 lbs since in the last 2 days.        6. INTERVENTIONS AND RESPONSE: \"What have you done so far to try to make this better? Nothing    Patient took the B-12 injection on 4-8-24 and then had B-12 levels drawn immediately afterward. Results were 7500. Patient's insurance said she can not have her level rechecked until next year.     Patient is concerned her B-12 level may be low again causing the one garbled sentence.    Protocols used: No Protocol Available-ADULT-[place]    "

## 2024-07-08 ENCOUNTER — TELEPHONE (OUTPATIENT)
Age: 74
End: 2024-07-08

## 2024-07-08 NOTE — TELEPHONE ENCOUNTER
Patient was seeing almost 9 months ago - in-office re-evaluation will be advised.   Please offer first available with Nancy in [place] office

## 2024-07-08 NOTE — TELEPHONE ENCOUNTER
Called patient. Offered patient an appointment with Gena for 7-10-24 at 2:00 pm at our [place] location and patient accepted. Patient voiced clear understanding and was appreciative of my call.

## 2024-07-10 ENCOUNTER — APPOINTMENT (OUTPATIENT)
Dept: LAB | Facility: MEDICAL CENTER | Age: 74
End: 2024-07-10
Payer: MEDICARE

## 2024-07-10 ENCOUNTER — OFFICE VISIT (OUTPATIENT)
Age: 74
End: 2024-07-10
Payer: MEDICARE

## 2024-07-10 VITALS
OXYGEN SATURATION: 96 % | HEART RATE: 63 BPM | DIASTOLIC BLOOD PRESSURE: 72 MMHG | WEIGHT: 216 LBS | BODY MASS INDEX: 40.78 KG/M2 | SYSTOLIC BLOOD PRESSURE: 140 MMHG | HEIGHT: 61 IN

## 2024-07-10 DIAGNOSIS — R47.89 EPISODE OF CHANGE IN SPEECH: Primary | ICD-10-CM

## 2024-07-10 DIAGNOSIS — E53.8 LOW SERUM VITAMIN B12: ICD-10-CM

## 2024-07-10 LAB — VIT B12 SERPL-MCNC: 486 PG/ML (ref 180–914)

## 2024-07-10 PROCEDURE — 36415 COLL VENOUS BLD VENIPUNCTURE: CPT

## 2024-07-10 PROCEDURE — 82607 VITAMIN B-12: CPT

## 2024-07-10 PROCEDURE — 99214 OFFICE O/P EST MOD 30 MIN: CPT

## 2024-07-10 NOTE — PATIENT INSTRUCTIONS
- Complete updated B12 level  - Continue Aspirin 81 mg daily  - Follow up with PCP for feeling of tongue/throat swelling  - If she has any symptoms concerning for TIA or stroke including sudden painless loss of vision or double vision, difficulty speaking or swallowing, vertigo/room spinning that does not quickly resolve, or weakness/numbness affecting 1 side of the face or body she should proceed by ambulance to the nearest emergency room immediately.

## 2024-07-10 NOTE — ASSESSMENT & PLAN NOTE
"Janeth Barillas is a 74 year old female who reports a few seconds of difficulty with speech (described as a hard time saying what she wanted to say) on 7/4. She immediately repeated herself and speech was completely normal. She has no other accompanying neurologic complaints. Occasionally she still feels her tongue and throat feel \"swollen\" although not painful and she denies any difficulty breathing or swallowing. Her neurologic exam is intact and non-focal. We discussed it is unclear if her symptoms are related to GERD vs PND. I have recommended follow up with her primary care provider for further investigation. Given this episode was very brief lasting only seconds, with no reoccurrence or other accompanying symptoms I do not feel this was a vascular event and have deferred repeat imaging at this time. We did review stroke symptoms to be aware of and reasons to go to the ER.     Plan:  - Complete updated B12 level  - Continue Aspirin 81 mg daily  - Follow up with PCP for feeling of tongue/throat swelling  - If she has any symptoms concerning for TIA or stroke including sudden painless loss of vision or double vision, difficulty speaking or swallowing, vertigo/room spinning that does not quickly resolve, or weakness/numbness affecting 1 side of the face or body she should proceed by ambulance to the nearest emergency room immediately.   - Follow up with neurology as needed   "

## 2024-07-10 NOTE — ASSESSMENT & PLAN NOTE
She was provided a repeat B12 level, given her last level was falsely elevated as it was drawn right after she administered a B12 shot. PCP is managing B12 supplementation.

## 2024-07-10 NOTE — PROGRESS NOTES
"Patient ID: Janeth Barillas is a 74 y.o. female.    Assessment/Plan:    Episode of change in speech  Janeth Barillas is a 74 year old female who reports a few seconds of difficulty with speech (described as a hard time saying what she wanted to say) on 7/4. She immediately repeated herself and speech was completely normal. She has no other accompanying neurologic complaints. Occasionally she still feels her tongue and throat feel \"swollen\" although not painful and she denies any difficulty breathing or swallowing. Her neurologic exam is intact and non-focal. We discussed it is unclear if her symptoms are related to GERD vs PND. I have recommended follow up with her primary care provider for further investigation. Given this episode was very brief lasting only seconds, with no reoccurrence or other accompanying symptoms I do not feel this was a vascular event and have deferred repeat imaging at this time. We did review stroke symptoms to be aware of and reasons to go to the ER.     Plan:  - Complete updated B12 level  - Continue Aspirin 81 mg daily  - Follow up with PCP for feeling of tongue/throat swelling  - If she has any symptoms concerning for TIA or stroke including sudden painless loss of vision or double vision, difficulty speaking or swallowing, vertigo/room spinning that does not quickly resolve, or weakness/numbness affecting 1 side of the face or body she should proceed by ambulance to the nearest emergency room immediately.   - Follow up with neurology as needed     Low serum vitamin B12  She was provided a repeat B12 level, given her last level was falsely elevated as it was drawn right after she administered a B12 shot. PCP is managing B12 supplementation.        Diagnoses and all orders for this visit:    Episode of change in speech    Low serum vitamin B12  -     Vitamin B12; Future         I have spent a total time of 20 minutes in caring for this patient on the day of the visit/encounter including " "Diagnostic results, Prognosis, Risk factor reductions, Impressions, Documenting in the medical record, Reviewing / ordering tests, medicine, procedures  , and Obtaining or reviewing history  .      Subjective:    NESTOR Barillas is a 74 year old female who was last evaluated by neurology 10/30/2023 for dizziness and cognitive dysfunction. 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     Today she presents to the office in urgent follow up after having an episode 7/4/2024 when speaking to her daughter where she states a sentence came out \"garbled\". She reports feeling like her tongue and throat were swollen. She states she felt like she could not say her words properly. This only lasted seconds, her daughter asked her to repeat herself and she did and her speech was normal. She states there were no other symptoms including but not limited to LOC, headache, change in vision, nausea, vomiting, weakness, numbness or tingling. She denies facial droop. She states there has been no reoccurrence. She denies any difficulty swallowing. She denies any sore throat. She does have a hx of GERD and takes protonix twice daily for this. She does have chronic PND and complains of allergies and congestion. She recently had a B12 level drawn >7500 but she had just administered her B12 injection. LDL 76 4/8/2024. She does take ASA 81 mg daily.     She denies any current neurologic symptoms.     The following portions of the patient's history were reviewed and updated as appropriate: allergies, current medications, past family history, past medical history, past social history, past surgical history, and problem list.         Objective:    Blood pressure 140/72, pulse 63, height 5' 1\" (1.549 m), weight 98 kg (216 lb), SpO2 96%.    Neurological Exam    On neurological examination patient is alert, " awake, oriented and in no distress. Speech is fluent without dysarthria or aphasia. Cranial nerves 2-12 were symmetrically intact bilaterally. No evidence of any focal weakness or sensory loss in the upper or lower extremities. Motor testing reveals 5/5 strength of the bilateral upper and lower extremities.There was no pronator drift.  No fasciculations present. No abnormal involuntary movements. Finger- to-nose reveals no tremor or ataxia and intact proprioceptive function, no dysmetria was noted. Rapid alternating movement normal. Sensation was intact to vibration, light touch, and temperature in bilateral upper and lower extremities. Deep tendon reflexes were 2+ and symmetric in the bilateral upper and lower extremities. She is able to rise easily without assistance from a seated position. Casual gait is normal including stance, stride, and arm swing. Romberg is absent.    ROS:    Review of Systems   Constitutional:  Negative for appetite change, fatigue and fever.   HENT: Negative.  Negative for hearing loss, tinnitus, trouble swallowing and voice change.    Eyes: Negative.  Negative for photophobia, pain and visual disturbance.   Respiratory: Negative.  Negative for shortness of breath.    Cardiovascular: Negative.  Negative for palpitations.   Gastrointestinal: Negative.  Negative for nausea and vomiting.   Endocrine: Negative.  Negative for cold intolerance.   Genitourinary: Negative.  Negative for dysuria, frequency and urgency.   Musculoskeletal:  Negative for back pain, gait problem, myalgias, neck pain and neck stiffness.   Skin: Negative.  Negative for rash.   Allergic/Immunologic: Negative.    Neurological:  Negative for dizziness, tremors, seizures, syncope, facial asymmetry, speech difficulty, weakness, light-headedness, numbness and headaches.   Hematological: Negative.  Does not bruise/bleed easily.   Psychiatric/Behavioral: Negative.  Negative for confusion, hallucinations and sleep disturbance.     All other systems reviewed and are negative.    Reviewed ROS as entered by medical assistant.

## 2024-07-26 ENCOUNTER — TELEPHONE (OUTPATIENT)
Dept: GASTROENTEROLOGY | Facility: CLINIC | Age: 74
End: 2024-07-26

## 2024-07-26 NOTE — TELEPHONE ENCOUNTER
Spoke to patient, she has an appointment to see Khloe in October and is on the wait list, she wants to know if she can Linzess 2x a day on occasion when she is having BM issues, not every day. She wants to make sure it is ok to take it 2x a day. Please advise

## 2024-08-26 DIAGNOSIS — K59.01 SLOW TRANSIT CONSTIPATION: Primary | ICD-10-CM

## 2024-08-29 ENCOUNTER — TELEPHONE (OUTPATIENT)
Age: 74
End: 2024-08-29

## 2024-08-29 NOTE — TELEPHONE ENCOUNTER
Patients GI provider:  ANANT Schwarz    Number to return call: [phone]    Reason for call: Pt called about a prior auth on her Linzess 290 MCG caps. Please reach out to to pt with the status of the prior auth. She only has 3 pills left.    Scheduled procedure/appointment date if applicable: Apt/procedure 10/30/24

## 2024-08-30 NOTE — TELEPHONE ENCOUNTER
PA for Linzess 290 mcg SUBMITTED     via    [x]CM-KEY: [id number]  [][employer]-Case ID #   []Faxed to plan   []Other website   []Phone call Case ID #     Office notes sent, clinical questions answered. Awaiting determination    Turnaround time for your insurance to make a decision on your Prior Authorization can take 7-21 business days.

## 2024-09-03 NOTE — TELEPHONE ENCOUNTER
PA for Linzess  APPROVED     Date(s) approved until 8/30/2025    Case #    Patient advised by          []MyChart Message  [x]Phone call pt voiced understanding  []LMOM  []L/M to call office as no active Communication consent on file  []Unable to leave detailed message as VM not approved on Communication consent       Pharmacy advised by    [x]Fax  []Phone call    Approval letter scanned into Media Yes

## 2024-09-20 ENCOUNTER — APPOINTMENT (OUTPATIENT)
Dept: LAB | Facility: CLINIC | Age: 74
End: 2024-09-20
Payer: MEDICARE

## 2024-09-20 ENCOUNTER — NURSE TRIAGE (OUTPATIENT)
Age: 74
End: 2024-09-20

## 2024-09-20 ENCOUNTER — TELEPHONE (OUTPATIENT)
Dept: GASTROENTEROLOGY | Facility: CLINIC | Age: 74
End: 2024-09-20

## 2024-09-20 DIAGNOSIS — K59.01 SLOW TRANSIT CONSTIPATION: Primary | ICD-10-CM

## 2024-09-20 RX ORDER — HYDROCORTISONE 25 MG/G
CREAM TOPICAL 2 TIMES DAILY
Qty: 28 G | Refills: 3 | Status: SHIPPED | OUTPATIENT
Start: 2024-09-20

## 2024-09-20 NOTE — TELEPHONE ENCOUNTER
Regarding: rectal bleeding, rectal pressure  ----- Message from Torie DWYER sent at 9/20/2024  7:46 AM EDT -----  Patient calling as she is experiencing rectal pressure and rectal bleeding with bowel movements. States that she is taking Miralax and Linzess to help with constipation. Is unsure what she should be doing as she states this is uncomfortable. Is scheduled for 10/30/24 and has been placed on the waitlist.

## 2024-09-20 NOTE — TELEPHONE ENCOUNTER
Called & spoke to patient. Gave patient message as per Khloe. She would like to try the Anusol and would like it called in to [facility] in [place]. Will route message to Khloe to call in anusol.

## 2024-09-20 NOTE — TELEPHONE ENCOUNTER
"BRB noted a few weeks ago with bowel movements, hx of hemorrhoids when pregnant previously (40 years ago). Patient is now having rectal pain, pressure with bowel movements, passing formed stool and does not feel as if she is fully evacuating. She notes a small lump rectum. BRB in toilet bowl. Not dizzy or lightheaded. She state she may overdue it with cleaning rectal area after bowel movement, I reviewed to be gentler with wiping in future. She is She is taking medications as ordered/fiber/watches diet. Patient has Sitz Bath and Prep H at home which she will administer. She is currently scheduled for visit 10/30/24 with ANNE Mcmahan. Please review/advise if additional orders or if patient should be seen sooner to address symptoms.     Patient did note that she has been recently diagnosed with polymyalgia rheumatica and Sjogren's in August, on Plaquenil ([facility] records).      Reason for Disposition   Home treatment > 3 days for rectal pain and not improved   Mild rectal pain    Answer Assessment - Initial Assessment Questions  1. SYMPTOM:  \"What's the main symptom you're concerned about?\" (e.g., pain, itching, swelling, rash)      Rectal pain and pressure  2. ONSET: \"When did the symptoms start?\"      About three weeks started with BRB blood and rectal pressure/pain started this week  3. RECTAL PAIN: \"Do you have any pain around your rectum?\" \"How bad is the pain?\"  (Scale 1-10; or mild, moderate, severe)   - MILD (1-3): doesn't interfere with normal activities    - MODERATE (4-7): interferes with normal activities or awakens from sleep, limping    - SEVERE (8-10): excruciating pain, unable to have a bowel movement       mild  4. RECTAL ITCHING: \"Do you have any itching in this area?\" \"How bad is the itching?\"  (Scale 1-10; or mild, moderate, severe)   - MILD - doesn't interfere with normal activities    - MODERATE-SEVERE: interferes with normal activities or awakens from sleep      denies  5. CONSTIPATION: \"Do you have " "constipation?\" If Yes, ask: \"How bad is it?\"      Hx of constipation but on Linzess and formed, straining recently  6. CAUSE: \"What do you think is causing the anus symptoms?\"      ?hemorrhoid  7. OTHER SYMPTOMS: \"Do you have any other symptoms?\"  (e.g., rectal bleeding, abdominal pain, vomiting, fever)      Some abdominal discomfort  8. PREGNANCY: \"Is there any chance you are pregnant?\" \"When was your last menstrual period?\"      N/A    Protocols used: Rectal Symptoms-ADULT-[place]    "

## 2024-09-24 ENCOUNTER — PREP FOR PROCEDURE (OUTPATIENT)
Dept: GASTROENTEROLOGY | Facility: CLINIC | Age: 74
End: 2024-09-24

## 2024-09-24 DIAGNOSIS — Z86.0100 HISTORY OF COLON POLYPS: ICD-10-CM

## 2024-09-24 DIAGNOSIS — K59.01 SLOW TRANSIT CONSTIPATION: Primary | ICD-10-CM

## 2024-09-24 DIAGNOSIS — Z86.010 HISTORY OF COLON POLYPS: ICD-10-CM

## 2024-09-24 NOTE — TELEPHONE ENCOUNTER
Called patient  reviewed and My Charting prep instructions - patient will begin drinking prep in the morning     Scheduled date of colonoscopy (as of today):10/1/24  Physician performing colonoscopy:William  Location of colonoscopy:[facility]  Bowel prep reviewed with patient:Dulco/Miralax  Instructions reviewed with patient by:Aiden winkler  Clearances: none

## 2024-10-01 ENCOUNTER — ANESTHESIA (OUTPATIENT)
Dept: GASTROENTEROLOGY | Facility: HOSPITAL | Age: 74
End: 2024-10-01
Payer: MEDICARE

## 2024-10-01 ENCOUNTER — HOSPITAL ENCOUNTER (OUTPATIENT)
Dept: GASTROENTEROLOGY | Facility: HOSPITAL | Age: 74
Setting detail: OUTPATIENT SURGERY
Discharge: HOME | End: 2024-10-01
Attending: INTERNAL MEDICINE
Payer: MEDICARE

## 2024-10-01 ENCOUNTER — ANESTHESIA EVENT (OUTPATIENT)
Dept: GASTROENTEROLOGY | Facility: HOSPITAL | Age: 74
End: 2024-10-01
Payer: MEDICARE

## 2024-10-01 VITALS
HEART RATE: 64 BPM | OXYGEN SATURATION: 99 % | HEIGHT: 61 IN | RESPIRATION RATE: 20 BRPM | WEIGHT: 226.41 LBS | DIASTOLIC BLOOD PRESSURE: 65 MMHG | SYSTOLIC BLOOD PRESSURE: 119 MMHG | TEMPERATURE: 97.8 F | BODY MASS INDEX: 42.75 KG/M2

## 2024-10-01 DIAGNOSIS — Z86.0100 HISTORY OF COLON POLYPS: ICD-10-CM

## 2024-10-01 DIAGNOSIS — K59.01 SLOW TRANSIT CONSTIPATION: ICD-10-CM

## 2024-10-01 PROCEDURE — 45378 DIAGNOSTIC COLONOSCOPY: CPT | Performed by: INTERNAL MEDICINE

## 2024-10-01 RX ORDER — SODIUM CHLORIDE, SODIUM LACTATE, POTASSIUM CHLORIDE, CALCIUM CHLORIDE 600; 310; 30; 20 MG/100ML; MG/100ML; MG/100ML; MG/100ML
INJECTION, SOLUTION INTRAVENOUS CONTINUOUS PRN
Status: DISCONTINUED | OUTPATIENT
Start: 2024-10-01 | End: 2024-10-01

## 2024-10-01 RX ORDER — PROPOFOL 10 MG/ML
INJECTION, EMULSION INTRAVENOUS AS NEEDED
Status: DISCONTINUED | OUTPATIENT
Start: 2024-10-01 | End: 2024-10-01

## 2024-10-01 RX ORDER — LIDOCAINE HYDROCHLORIDE 10 MG/ML
INJECTION, SOLUTION EPIDURAL; INFILTRATION; INTRACAUDAL; PERINEURAL AS NEEDED
Status: DISCONTINUED | OUTPATIENT
Start: 2024-10-01 | End: 2024-10-01

## 2024-10-01 RX ADMIN — LIDOCAINE HYDROCHLORIDE 50 MG: 10 INJECTION, SOLUTION EPIDURAL; INFILTRATION; INTRACAUDAL; PERINEURAL at 11:34

## 2024-10-01 RX ADMIN — SODIUM CHLORIDE, SODIUM LACTATE, POTASSIUM CHLORIDE, AND CALCIUM CHLORIDE: .6; .31; .03; .02 INJECTION, SOLUTION INTRAVENOUS at 11:24

## 2024-10-01 RX ADMIN — PROPOFOL 20 MG: 10 INJECTION, EMULSION INTRAVENOUS at 11:40

## 2024-10-01 RX ADMIN — PROPOFOL 100 MG: 10 INJECTION, EMULSION INTRAVENOUS at 11:34

## 2024-10-01 RX ADMIN — PROPOFOL 20 MG: 10 INJECTION, EMULSION INTRAVENOUS at 11:38

## 2024-10-01 RX ADMIN — PROPOFOL 20 MG: 10 INJECTION, EMULSION INTRAVENOUS at 11:43

## 2024-10-01 NOTE — H&P
"History and Physical -  [facility]  Janeth Barillas 74 y.o. female MRN: [id number]      HPI: Janeth Barillas is a 74 y.o. year old female who presents for change in bowel habits      REVIEW OF SYSTEMS: Per the HPI, and otherwise unremarkable.    Historical Information   Past Medical History:   Diagnosis Date    Cancer (HCC)     Colon polyp     Deviated septum     Diverticulosis     Endometrial cancer (HCC)     Michelle filter in place     Hx of abdominoplasty     Hypoglycemia     Sleep apnea Unnone    Mild    Syncope     Veritgo from time to time     Past Surgical History:   Procedure Laterality Date    ABDOMINOPLASTY      CHOLECYSTECTOMY      COLONOSCOPY      GASTRIC BYPASS       Social History   Social History     Substance and Sexual Activity   Alcohol Use Never     Social History     Substance and Sexual Activity   Drug Use Never     Social History     Tobacco Use   Smoking Status Former    Current packs/day: 0.00    Average packs/day: 2.0 packs/day for 20.8 years (41.6 ttl pk-yrs)    Types: Cigarettes    Start date: 1970    Quit date: 10/19/1990    Years since quittin.9   Smokeless Tobacco Never   Tobacco Comments    Quit      Family History   Problem Relation Age of Onset    Cancer Mother     Heart disease Mother     Breast cancer additional onset Mother     Neuropathy Mother     Kidney disease Father     Hypertension Father        Meds/Allergies     Not in a hospital admission.    No Known Allergies    Objective     Blood pressure 127/61, pulse 67, temperature 97.7 °F (36.5 °C), resp. rate 18, height 5' 1\" (1.549 m), weight 103 kg (226 lb 6.6 oz), SpO2 98%.      PHYSICAL EXAM    Gen: NAD  CV: RRR  CHEST: Clear  ABD: soft, NT/ND  EXT: no edema      ASSESSMENT/PLAN:  This is a 74 y.o. year old female here for colonoscopy, and she is stable and optimized for her procedure.          "

## 2024-10-01 NOTE — ANESTHESIA PREPROCEDURE EVALUATION
Procedure:  COLONOSCOPY    Relevant Problems   No relevant active problems      42.8    Physical Exam    Airway    Mallampati score: II  TM Distance: >3 FB  Neck ROM: full     Dental   No notable dental hx     Cardiovascular      Pulmonary      Other Findings  post-pubertal.      Anesthesia Plan  ASA Score- 3     Anesthesia Type- IV sedation with anesthesia with ASA Monitors.         Additional Monitors:     Airway Plan:            Plan Factors-Exercise tolerance (METS): >4 METS.    Chart reviewed.    Patient summary reviewed.                  Induction- intravenous.    Postoperative Plan-     Perioperative Resuscitation Plan - Level 1 - Full Code.       Informed Consent- Anesthetic plan and risks discussed with patient.  I personally reviewed this patient with the CRNA. Discussed and agreed on the Anesthesia Plan with the CRNA..

## 2024-10-01 NOTE — ANESTHESIA POSTPROCEDURE EVALUATION
Post-Op Assessment Note    CV Status:  Stable  Pain Score: 0    Pain management: adequate       Mental Status:  Alert and awake   Hydration Status:  Euvolemic   PONV Controlled:  Controlled   Airway Patency:  Patent     Post Op Vitals Reviewed: Yes    No anethesia notable event occurred.    Staff: CRNA               /59 (10/01/24 1149)    Temp 97.8 °F (36.6 °C) (10/01/24 1149)    Pulse 61 (10/01/24 1149)   Resp 16 (10/01/24 1149)    SpO2 95 % (10/01/24 1149)

## 2024-10-23 ENCOUNTER — OFFICE VISIT (OUTPATIENT)
Dept: GASTROENTEROLOGY | Facility: CLINIC | Age: 74
End: 2024-10-23
Payer: MEDICARE

## 2024-10-23 VITALS
BODY MASS INDEX: 42.33 KG/M2 | TEMPERATURE: 98.1 F | WEIGHT: 224.2 LBS | HEIGHT: 61 IN | DIASTOLIC BLOOD PRESSURE: 72 MMHG | SYSTOLIC BLOOD PRESSURE: 122 MMHG | HEART RATE: 64 BPM

## 2024-10-23 DIAGNOSIS — K59.01 SLOW TRANSIT CONSTIPATION: Primary | ICD-10-CM

## 2024-10-23 DIAGNOSIS — K21.9 GASTROESOPHAGEAL REFLUX DISEASE WITHOUT ESOPHAGITIS: ICD-10-CM

## 2024-10-23 PROCEDURE — 99213 OFFICE O/P EST LOW 20 MIN: CPT | Performed by: PHYSICIAN ASSISTANT

## 2024-10-23 RX ORDER — PYRIDOXINE HCL (VITAMIN B6) 25 MG
50 TABLET ORAL DAILY
COMMUNITY

## 2024-10-23 RX ORDER — HYDROXYCHLOROQUINE SULFATE 200 MG/1
TABLET, FILM COATED ORAL
COMMUNITY

## 2024-10-23 NOTE — PATIENT INSTRUCTIONS
Patient Education     Constipation, Adult ED   General Information   You came to the Emergency Department (ED) for constipation. This is the medical term for when your bowel movements are too hard, too small, or don’t happen often enough. It can also be hard to have a bowel movement. Most of the time, you can treat your constipation at home.  What care is needed at home?   Call your regular doctor to let them know you were in the ED. Make a follow-up appointment if you were told to.  Eat high fiber foods. These include whole grains, fruits, and vegetables.  Drink plenty of water and other fluids each day. This helps to keep your bowel movements soft.  Set a regular schedule to try and have a bowel movement. Do not ignore the urge to have a bowel movement.  Give yourself plenty of time to have a bowel movement.  Do mild exercise like taking a walk.  Sitting in a warm bath can help you relax and feel like you can have a bowel movement.  Talk to your regular doctor before you use laxatives or enemas regularly.  When do I need to get emergency help?   Call for an ambulance right away if:   You have sudden severe belly pain or the pain is constant.  Your belly becomes very hard or swollen.  You start throwing up blood.  You pass a lot of blood in your bowel movements.  Return to the ED if:   Your bowel movements are black or tar colored.  You throw up a lot and can’t keep liquids down.  You have a fever of 102.2° F (39°C) or higher.  When do I need to call the doctor?   You have a fever of 100.4°F (38°C) or higher or chills.  Your bowel movements have a small amount (less than 1 teaspoon or 5 mL) of blood in them.  You feel that something is not right in your belly.  You have hemorrhoids.  You have hard bowel movements for more than 2 weeks with belly pain.  You have new or worsening symptoms.  Last Reviewed Date   2021-03-18  Consumer Information Use and Disclaimer   This generalized information is a limited summary of  diagnosis, treatment, and/or medication information. It is not meant to be comprehensive and should be used as a tool to help the user understand and/or assess potential diagnostic and treatment options. It does NOT include all information about conditions, treatments, medications, side effects, or risks that may apply to a specific patient. It is not intended to be medical advice or a substitute for the medical advice, diagnosis, or treatment of a health care provider based on the health care provider's examination and assessment of a patient’s specific and unique circumstances. Patients must speak with a health care provider for complete information about their health, medical questions, and treatment options, including any risks or benefits regarding use of medications. This information does not endorse any treatments or medications as safe, effective, or approved for treating a specific patient. [employer] and its affiliates disclaim any warranty or liability relating to this information or the use thereof. The use of this information is governed by the Terms of Use, available at https://www.wolterskluwer.com/en/know/clinical-effectiveness-terms   Copyright   Copyright © 2024 [employer] and its affiliates and/or licensors. All rights reserved.

## 2024-10-23 NOTE — PROGRESS NOTES
"Ambulatory Visit  Name: Janeth Barillas      : 1950      MRN: [id number]  Encounter Provider: Khloe Mcmahan PA-C  Encounter Date: 10/23/2024   Encounter department: [facility] [place]    Assessment & Plan  Slow transit constipation  Patient will continue Linzess 290 mcg twice daily.    Patient will continue MiraLAX as needed.    Continue high-fiber diet and plenty of water intake.    Patient is due for recall colonoscopy in .       Gastroesophageal reflux disease without esophagitis  Continue pantoprazole 40 mg daily.    Continue GERD dietary modifications.    Office visit in 6 months.         History of Present Illness     aJneth Barillas is a 74 y.o. female who presents for routine GI follow up.     Overall patient is feeling quite well.  She reports that her biggest frustration is that she needs to have a BMI below 40 in order to undergo her knee replacement.  She has lost a significant amount of weight over the past several months.  She reports that she is still struggling with her lymphedema.  She is currently maintained on Linzess to 90 twice a day.  She reports she will utilize MiraLAX as needed.  She reports that her acid reflux is under great control on pantoprazole 40 mg daily.  She is up-to-date with all routine EGD and colonoscopy at this time.        Review of Systems   Constitutional:  Negative for fever.   Gastrointestinal:  Positive for abdominal pain, constipation and nausea. Negative for diarrhea and vomiting.   Genitourinary:  Negative for dysuria and hematuria.   Musculoskeletal:  Positive for arthralgias. Negative for myalgias.   Neurological:  Negative for headaches.           Objective     /72 (BP Location: Left arm, Patient Position: Sitting, Cuff Size: Standard)   Pulse 64   Temp 98.1 °F (36.7 °C) (Tympanic)   Ht 5' 1\" (1.549 m)   Wt 102 kg (224 lb 3.2 oz)   BMI 42.36 kg/m²     Physical Exam  Vitals and nursing note reviewed. "     Answers submitted by the patient for this visit:  Abdominal Pain Questionnaire (Submitted on 10/22/2024)  Chief Complaint: Abdominal pain  Chronicity: recurrent  Onset: in the past 7 days  Onset quality: gradual  Frequency: intermittently  Progression since onset: unchanged  Pain location: suprapubic region  Pain - numeric: 1/10  Pain quality: dull, a sensation of fullness  Radiates to: does not radiate  anorexia: Yes  belching: No  hematochezia: No  melena: No  weight loss: No  Aggravated by: nothing  Relieved by: bowel movements  Diagnostic workup: lower endoscopy

## 2024-10-30 DIAGNOSIS — K21.9 GASTROESOPHAGEAL REFLUX DISEASE WITHOUT ESOPHAGITIS: ICD-10-CM

## 2024-10-30 RX ORDER — PANTOPRAZOLE SODIUM 40 MG/1
TABLET, DELAYED RELEASE ORAL
Qty: 180 TABLET | Refills: 1 | Status: SHIPPED | OUTPATIENT
Start: 2024-10-30

## 2025-04-09 ENCOUNTER — OFFICE VISIT (OUTPATIENT)
Dept: GASTROENTEROLOGY | Facility: CLINIC | Age: 75
End: 2025-04-09
Payer: MEDICARE

## 2025-04-09 VITALS — HEIGHT: 61 IN | BODY MASS INDEX: 42.36 KG/M2

## 2025-04-09 DIAGNOSIS — K21.9 GASTROESOPHAGEAL REFLUX DISEASE WITHOUT ESOPHAGITIS: Primary | ICD-10-CM

## 2025-04-09 DIAGNOSIS — K63.8219 SMALL INTESTINAL BACTERIAL OVERGROWTH (SIBO): ICD-10-CM

## 2025-04-09 DIAGNOSIS — K59.01 SLOW TRANSIT CONSTIPATION: ICD-10-CM

## 2025-04-09 PROBLEM — E66.01 MORBID (SEVERE) OBESITY DUE TO EXCESS CALORIES (HCC): Status: ACTIVE | Noted: 2025-04-09

## 2025-04-09 PROCEDURE — 99213 OFFICE O/P EST LOW 20 MIN: CPT | Performed by: PHYSICIAN ASSISTANT

## 2025-04-09 NOTE — PROGRESS NOTES
"Name: Janeth Barillas      : 1950      MRN: [id number]  Encounter Provider: Khloe Mcmahan PA-C  Encounter Date: 2025   Encounter department: [facility] [place]  :  Assessment & Plan  Gastroesophageal reflux disease without esophagitis  Will continue pantoprazole 40 mg twice daily       Slow transit constipation  Will continue Linzess 290 mcg daily.       Small intestinal bacterial overgrowth (SIBO)  Patient does have a history of SIBO.  Will retreat with Xifaxan course.       Follow-up in 3 to 4 months.    History of Present Illness   HPI  Janeth Barillas is a 75 y.o. female who presents with a past medical history significant for SIBO, GERD, IBS-C.  Patient reports that she is currently maintained on Linzess 290 mcg for her constipation.  She does report that this is working for her.  She does report occasional episodes of abdominal bloating and right sided abdominal cramping.  As far as her acid reflux is concerned she is currently maintained on pantoprazole 40 mg twice a day which really does control her symptoms.  She does have a history of SIBO and is concerned that this may be the cause of her abdominal bloating and gas.    Patient denies any alarm symptoms.    She is losing weight intentionally because she needs to have orthopedic surgery but they will not do it until she is less than 200 pounds.  Patient is up-to-date with routine EGD and colonoscopy.          Review of Systems       Objective   Ht 5' 1\" (1.549 m)   BMI 42.36 kg/m²      Physical Exam      "

## 2025-06-06 ENCOUNTER — TELEPHONE (OUTPATIENT)
Age: 75
End: 2025-06-06

## 2025-06-06 DIAGNOSIS — R20.2 PARESTHESIA: ICD-10-CM

## 2025-06-06 DIAGNOSIS — R41.9 COGNITIVE COMPLAINTS: ICD-10-CM

## 2025-06-06 DIAGNOSIS — R47.89 EPISODE OF CHANGE IN SPEECH: Primary | ICD-10-CM

## 2025-06-06 NOTE — TELEPHONE ENCOUNTER
"Pt called. She had an OV with her rheumatologist on 6/4/25. Pt has Sjogren's syndrome. Her rheumatologist advised that she call neurology to get a sooner OV. Pt is currently scheduled for 7/16/25. No sooner OV are available.    For the past 3 months, pt has been having episodes of feeling like her throat is swollen. When this happens she has \"garbled speech\" and has difficulty articulating her words. Sometimes she does not notice that she has a speech issue until her family brings it to her attention. She takes an ibuprofen and the sensation goes away and she is able to speak again. It does not occur daily, but it does occur a few times a week. If she does not take ibuprofen, the swollen feeling in her throat lasts all day. The issue with the speech resolves quickly. When it started about 3 months ago, she received some disturbing news, so she is questioning if it could be stressed related. During these episodes, she has no issues swallowing. Pt has an OV scheduled with her PCP today to discuss these episodes. Pt denies headaches or any other stroke like symptoms.     Pt also had one other isolated episode where she had tingling in her left hand and under her left eye and cheek. These areas started to tingle, and then she felt a burning sensation. It then resolved. Pt reports that she has been diagnosed with Raynaud's.     CB# [phone] - okay to leave a detailed message on voice mail.  "

## 2025-06-06 NOTE — TELEPHONE ENCOUNTER
"These are not new symptoms. Please review my last office note. At that time she was c/o similar episodes of speech dysfunction and feeling as if her throat/tongue were \"swollen\". I reviewed rheumatology's note- they advised follow up with neurology to ensure not stroke related. Given episodes reoccur and have been on going for more than a year unlikely to be related to acute ischemia. However, I will order a mri brain for her to have completed prior to her next visit with me. If any new or worsening stroke like symptoms in the interim she is to go to the ER.  "

## 2025-06-10 NOTE — TELEPHONE ENCOUNTER
Called and reviewed message with pt. Pt stated she had the MRI Brain done on 6/6/25 at [facility] and blood work as well.    MRI report and labs available to view.

## 2025-06-10 NOTE — TELEPHONE ENCOUNTER
Reviewed labs and MRI which were all normal. MRI confirmed her symptoms are not stroke related.  I will cancel order for MRI I entered, and will see her at her next follow up.   PCP follow up is encouraged in the meantime if symptoms persist.

## 2025-06-12 NOTE — TELEPHONE ENCOUNTER
Patient called office back due to miss call.    I reviewed provider's note below. Pt understood and had no further questions at this time.    ____________________________  YUMI Preciado       6/10/25 10:21 AM  Note     Reviewed labs and MRI which were all normal. MRI confirmed her symptoms are not stroke related.  I will cancel order for MRI I entered, and will see her at her next follow up.   PCP follow up is encouraged in the meantime if symptoms persist.

## 2025-07-10 ENCOUNTER — TELEPHONE (OUTPATIENT)
Dept: NEUROLOGY | Facility: CLINIC | Age: 75
End: 2025-07-10

## 2025-07-10 NOTE — TELEPHONE ENCOUNTER
Spoke with patient to confirm patient's appointment on 7/16  with Nancy.  Confirmed time, date, location.

## 2025-07-16 ENCOUNTER — OFFICE VISIT (OUTPATIENT)
Age: 75
End: 2025-07-16
Payer: MEDICARE

## 2025-07-16 VITALS
BODY MASS INDEX: 42.29 KG/M2 | SYSTOLIC BLOOD PRESSURE: 126 MMHG | DIASTOLIC BLOOD PRESSURE: 82 MMHG | HEIGHT: 61 IN | OXYGEN SATURATION: 96 % | HEART RATE: 59 BPM | WEIGHT: 224 LBS

## 2025-07-16 DIAGNOSIS — R47.89 EPISODE OF CHANGE IN SPEECH: Primary | ICD-10-CM

## 2025-07-16 DIAGNOSIS — R42 LIGHTHEADEDNESS: ICD-10-CM

## 2025-07-16 DIAGNOSIS — H93.19 TINNITUS: ICD-10-CM

## 2025-07-16 PROCEDURE — 99214 OFFICE O/P EST MOD 30 MIN: CPT

## 2025-07-16 RX ORDER — LORATADINE 10 MG/1
10 TABLET ORAL DAILY
COMMUNITY
Start: 2025-06-02

## 2025-07-16 RX ORDER — FERROUS SULFATE 325(65) MG
1 TABLET ORAL
COMMUNITY
Start: 2025-07-05

## 2025-07-16 RX ORDER — VENLAFAXINE HYDROCHLORIDE 37.5 MG/1
37.5 CAPSULE, EXTENDED RELEASE ORAL DAILY
COMMUNITY
Start: 2025-06-23 | End: 2026-06-23

## 2025-07-16 NOTE — ASSESSMENT & PLAN NOTE
Patient reports episodic lightheadedness and aphasia of unclear etiology.  MRI brain has been negative for any acute ischemia.  Carotid Doppler with less than 50% stenosis  B12 level is adequate  She reports her sister has similar episodes however hers are associated with syncope.  Will have her complete routine EEG to rule out seizure.  Will also have her complete CTA head to rule out aneurysm or other large vessel occlusion.  I have also recommended that she follow-up with cardiology for cardiac monitoring to rule out A-fib or other cardiac arrhythmia that could be contributing to her symptoms.  She will continue aspirin 81 mg daily.    Orders:    CTA head w wo contrast; Future    Ambulatory Referral to Cardiology; Future    EEG Routine and awake; Future

## 2025-07-16 NOTE — PROGRESS NOTES
Name: Janeth Barillas      : 1950      MRN: [id number]  Encounter Provider: YUMI Preciado  Encounter Date: 2025   Encounter department: [facility] [place]  :  Assessment & Plan  Episode of change in speech  Patient reports episodic lightheadedness and aphasia of unclear etiology.  MRI brain has been negative for any acute ischemia.  Carotid Doppler with less than 50% stenosis  B12 level is adequate  She reports her sister has similar episodes however hers are associated with syncope.  Will have her complete routine EEG to rule out seizure.  Will also have her complete CTA head to rule out aneurysm or other large vessel occlusion.  I have also recommended that she follow-up with cardiology for cardiac monitoring to rule out A-fib or other cardiac arrhythmia that could be contributing to her symptoms.  She will continue aspirin 81 mg daily.    Orders:    CTA head w wo contrast; Future    Ambulatory Referral to Cardiology; Future    EEG Routine and awake; Future    Lightheadedness  Patient reports episodic lightheadedness and aphasia of unclear etiology.  MRI brain has been negative for any acute ischemia.  She reports her sister has similar episodes however hers are associated with syncope.  Will have her complete routine EEG to rule out seizure.  Will also have her complete CTA head to rule out aneurysm or other large vessel occlusion.  I have also recommended that she follow-up with cardiology for cardiac monitoring to rule out A-fib or other cardiac arrhythmia that could be contributing to her symptoms.    Orders:    CTA head w wo contrast; Future    Ambulatory Referral to Cardiology; Future    EEG Routine and awake; Future    Tinnitus  We will proceed with CTA head to rule out aneurysm or other large vessel occlusion given episodes of lightheadedness and aphasia.    Orders:    CTA head w wo contrast; Future      Patient Instructions   - Complete CTA head wwo  -  "Complete EEG  - Referral to Cardiology for cardiac monitoring  - Follow up after testing is complete      History of Present Illness     HPI Janeth Barillas is a 75 year old female who is known to the practice for a hx of dizziness and cognitive dysfunction. 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 She was last seen 7/10/2024 after having an episode of garbled speech where she described her tongue and throat feeling swollen.  She reported that she occasionally had those symptoms but denied any other accompanying neurologic symptoms.  It was thought that her symptoms were possibly related to GERD versus PND, therefore it was recommended that she follow-up with her primary care physician for further investigation.  Given the episode was very brief lasting only seconds with no reoccurrence or other accompanying symptoms it was less likely thought to be related to a vascular event and repeat imaging was deferred.  Nonetheless she was advised to continue aspirin 81 mg daily and was provided with stroke education and reasons to return to the emergency room.  She was going to follow-up with neurology on an as-needed basis.     Today she presents to the office in urgent follow up after experiencing 2 more events.  She states 2 months ago she was sitting at the table talking to her son when she suddenly began speaking \" funny\".  Her  further elaborates that her speech sounded slurred.  This lasted only a few minutes and then resolved.  Then again more recently 2 weeks ago she got out of the car and walked into a medical facility for her carotid ultrasound and began to feel off balance, lightheaded, flush and reports having difficulty expressing herself.  She states she was speaking to the tech and she could understand her so does not believe that her speech was slurred or garbled " however feels that it took a lot of effort for her to get her words out.  She states the entire episode lasted about 5 minutes before she returned back to baseline. She states there were no other symptoms including but not limited to LOC, headache, change in vision, nausea, vomiting, weakness, numbness or tingling. She denies facial droop. After the first more recent event she did have an MRI of the brain which was done and showed no areas of acute ischemia.  Her carotid Doppler had less than 50% stenosis.      6/27/2025 US Carotid duplex  1. Moderate bilateral carotid atherosclerosis causing less than 50% carotid   stenosis bilaterally. 2. Normal antegrade vertebral flow bilaterally.     MRI Brain wo 6/6/25  Impression: No acute hemorrhage, mass or ischemia identified.     She continues on aspirin 81 mg daily.  Interestingly enough she states that her sister has similar episodes however her episodes are usually followed by a period of syncope and thought to be provoked by stress.  She denies ever having syncope herself.  She denies being under more stress than usual, however states she does feel as if she is a hypochondriac because she has not been able to identify a cause for her current symptoms which are very concerning to her.  She states her blood pressure was not checked during either 1 of these episodes, however she does now wear a watch that continuously monitors her blood pressure and pulse.  She states she has a cardiologist however has not recently seen them for any type of cardiac testing.  She does follow with a rheumatologist however it is unclear if they feel that any of her symptoms are related to her autoimmune disorders.    She denies any current neurologic symptoms.     Review of Systems   Constitutional:  Negative for appetite change, fatigue and fever.   HENT: Negative.  Negative for hearing loss, tinnitus, trouble swallowing and voice change.    Eyes: Negative.  Negative for photophobia, pain  "and visual disturbance.   Respiratory: Negative.  Negative for shortness of breath.    Cardiovascular: Negative.  Negative for palpitations.   Gastrointestinal: Negative.  Negative for nausea and vomiting.   Endocrine: Negative.  Negative for cold intolerance.   Genitourinary: Negative.  Negative for dysuria, frequency and urgency.   Musculoskeletal:  Negative for back pain, gait problem, myalgias, neck pain and neck stiffness.   Skin: Negative.  Negative for rash.   Allergic/Immunologic: Negative.    Neurological:  Negative for dizziness, tremors, seizures, syncope, facial asymmetry, speech difficulty, weakness, light-headedness, numbness and headaches.   Hematological: Negative.  Does not bruise/bleed easily.   Psychiatric/Behavioral: Negative.  Negative for confusion, hallucinations and sleep disturbance.    All other systems reviewed and are negative.    I have personally reviewed the MA's review of systems and made changes as necessary.    Medications Ordered Prior to Encounter[1]      Objective   /82 (BP Location: Right arm, Patient Position: Sitting, Cuff Size: Standard)   Pulse 59   Ht 5' 1\" (1.549 m)   Wt 102 kg (224 lb)   SpO2 96%   BMI 42.32 kg/m²     Neurological Exam  On neurological examination patient is alert, awake, oriented and in no distress. Speech is fluent without dysarthria or aphasia. Cranial nerves 2-12 were symmetrically intact bilaterally. No evidence of any focal weakness or sensory loss in the upper or lower extremities. Motor testing reveals 5/5 strength of the bilateral upper and lower extremities.There was no pronator drift.  No fasciculations present. No abnormal involuntary movements. Finger- to-nose reveals no tremor or ataxia and intact proprioceptive function, no dysmetria was noted. Rapid alternating movement normal. Sensation was intact to vibration, light touch, and temperature in bilateral upper and lower extremities. Deep tendon reflexes were 2+ and symmetric in " the bilateral upper and lower extremities. She is able to rise easily without assistance from a seated position. Casual gait is normal including stance, stride, and arm swing. Romberg is absent.       Administrative Statements   I have spent a total time of 30 minutes in caring for this patient on the day of the visit/encounter including Diagnostic results, Prognosis, Risks and benefits of tx options, Instructions for management, Patient and family education, Importance of tx compliance, Risk factor reductions, Impressions, Counseling / Coordination of care, Documenting in the medical record, Reviewing/placing orders in the medical record (including tests, medications, and/or procedures), and Obtaining or reviewing history  .       [1]   Current Outpatient Medications on File Prior to Visit   Medication Sig Dispense Refill    ascorbic acid (VITAMIN C) 250 MG tablet Take 1,000 mg by mouth in the morning.      aspirin 81 MG tablet Take 81 mg by mouth in the morning.      Calcium Carbonate-Vitamin D3 600-400 MG-UNIT TABS Take by mouth in the morning.      chlorhexidine (HIBICLENS) 4 % external liquid       cyanocobalamin 1,000 mcg/mL   5    ferrous sulfate 325 (65 Fe) mg tablet Take 1 tablet by mouth daily with breakfast      fluticasone (FLONASE) 50 mcg/act nasal spray   3    furosemide (LASIX) 20 mg tablet Take 20 mg by mouth in the morning.  1    hydroxychloroquine (PLAQUENIL) 200 mg tablet       ibuprofen (MOTRIN) 800 mg tablet Take 800 mg by mouth      KLOR-CON M10 10 MEQ tablet Take 10 mEq by mouth in the morning and 10 mEq in the evening.  0    linaCLOtide 290 MCG CAPS Take 1 capsule by mouth daily before breakfast 90 capsule 3    loratadine (CLARITIN) 10 mg tablet Take 10 mg by mouth daily      multivitamin (THERAGRAN) TABS Take 1 tablet by mouth in the morning.      pantoprazole (PROTONIX) 40 mg tablet TAKE 1 TABLET (40 MG TOTAL) BY MOUTH TWO (TWO) TIMES A  tablet 1    potassium chloride (Klor-Con) 10  mEq tablet       Probiotic Product (PROBIOTIC-10 PO)       sodium chloride (OCEAN) 0.65 % nasal spray 1 spray into each nostril      Thiamine HCl (VITAMIN B-1 PO) Take 100 mg by mouth in the morning.      venlafaxine (EFFEXOR-XR) 37.5 mg 24 hr capsule Take 37.5 mg by mouth daily      hydrocortisone (ANUSOL-HC) 2.5 % rectal cream Apply topically 2 (two) times a day (Patient not taking: Reported on 7/16/2025) 28 g 3    [DISCONTINUED] Cholecalciferol 50 MCG (2000 UT) CAPS Take 1 capsule by mouth daily (Patient not taking: Reported on 7/10/2024)      [DISCONTINUED] Cyanocobalamin (Vitamin B-12) 1000 MCG SUBL PLACE 1 TABLET (1,000 MCG TOTAL) UNDER THE TONGUE DAILY (Patient not taking: No sig reported) 90 tablet 0    [DISCONTINUED] fexofenadine (ALLEGRA) 180 MG tablet Take 180 mg by mouth daily (Patient not taking: Reported on 7/16/2025)      [DISCONTINUED] pyridoxine (B-6) 25 MG tablet Take 50 mg by mouth daily (Patient not taking: Reported on 7/16/2025)       No current facility-administered medications on file prior to visit.

## 2025-07-16 NOTE — PATIENT INSTRUCTIONS
- Complete CTA head wwo  - Complete EEG  - Referral to Cardiology for cardiac monitoring  - Follow up after testing is complete

## 2025-08-19 ENCOUNTER — HOSPITAL ENCOUNTER (OUTPATIENT)
Dept: CT IMAGING | Facility: HOSPITAL | Age: 75
Discharge: HOME | End: 2025-08-19
Payer: MEDICARE

## 2025-08-19 DIAGNOSIS — H93.19 TINNITUS: ICD-10-CM

## 2025-08-19 DIAGNOSIS — R42 LIGHTHEADEDNESS: ICD-10-CM

## 2025-08-19 DIAGNOSIS — R47.89 EPISODE OF CHANGE IN SPEECH: ICD-10-CM

## 2025-08-19 PROCEDURE — 70496 CT ANGIOGRAPHY HEAD: CPT

## 2025-08-19 RX ADMIN — IOHEXOL 85 ML: 350 INJECTION, SOLUTION INTRAVENOUS at 13:40

## 2025-08-20 DIAGNOSIS — K21.9 GASTROESOPHAGEAL REFLUX DISEASE WITHOUT ESOPHAGITIS: ICD-10-CM

## 2025-08-20 RX ORDER — PANTOPRAZOLE SODIUM 40 MG/1
40 TABLET, DELAYED RELEASE ORAL 2 TIMES DAILY
Qty: 180 TABLET | Refills: 0 | Status: SHIPPED | OUTPATIENT
Start: 2025-08-20